# Patient Record
Sex: FEMALE | Race: WHITE | Employment: PART TIME | URBAN - METROPOLITAN AREA
[De-identification: names, ages, dates, MRNs, and addresses within clinical notes are randomized per-mention and may not be internally consistent; named-entity substitution may affect disease eponyms.]

---
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
## 2021-05-07 ENCOUNTER — HOSPITAL ENCOUNTER (OUTPATIENT)
Dept: ULTRASOUND IMAGING | Age: 21
Discharge: HOME | End: 2021-05-07
Payer: COMMERCIAL

## 2021-05-07 DIAGNOSIS — N91.3 PRIMARY OLIGOMENORRHEA: ICD-10-CM

## 2021-05-07 PROCEDURE — 76830 TRANSVAGINAL US NON-OB: CPT

## 2021-05-07 PROCEDURE — 76856 US EXAM PELVIC COMPLETE: CPT

## 2021-12-04 ENCOUNTER — HOSPITAL ENCOUNTER (EMERGENCY)
Age: 21
Discharge: HOME | End: 2021-12-04
Payer: COMMERCIAL

## 2021-12-04 VITALS
OXYGEN SATURATION: 100 % | DIASTOLIC BLOOD PRESSURE: 91 MMHG | RESPIRATION RATE: 20 BRPM | SYSTOLIC BLOOD PRESSURE: 160 MMHG | HEART RATE: 84 BPM | TEMPERATURE: 98.1 F

## 2021-12-04 DIAGNOSIS — J01.90 ACUTE SINUSITIS, RECURRENCE NOT SPECIFIED, UNSPECIFIED LOCATION: Primary | ICD-10-CM

## 2021-12-04 LAB
RAPID INFLUENZA  B AGN: NEGATIVE
RAPID INFLUENZA A AGN: NEGATIVE
S PYO AG THROAT QL: NEGATIVE
SARS-COV-2, NAAT: NOT DETECTED

## 2021-12-04 PROCEDURE — 87081 CULTURE SCREEN ONLY: CPT

## 2021-12-04 PROCEDURE — 6370000000 HC RX 637 (ALT 250 FOR IP): Performed by: PHYSICIAN ASSISTANT

## 2021-12-04 PROCEDURE — 87635 SARS-COV-2 COVID-19 AMP PRB: CPT

## 2021-12-04 PROCEDURE — 87804 INFLUENZA ASSAY W/OPTIC: CPT

## 2021-12-04 PROCEDURE — 87880 STREP A ASSAY W/OPTIC: CPT

## 2021-12-04 PROCEDURE — 99283 EMERGENCY DEPT VISIT LOW MDM: CPT

## 2021-12-04 RX ORDER — PSEUDOEPHEDRINE HYDROCHLORIDE 30 MG/1
60 TABLET ORAL ONCE
Status: COMPLETED | OUTPATIENT
Start: 2021-12-04 | End: 2021-12-04

## 2021-12-04 RX ORDER — PSEUDOEPHEDRINE HYDROCHLORIDE 30 MG/1
30 TABLET ORAL EVERY 6 HOURS PRN
Qty: 30 TABLET | Refills: 1 | Status: SHIPPED | OUTPATIENT
Start: 2021-12-04 | End: 2021-12-19

## 2021-12-04 RX ORDER — FLUTICASONE PROPIONATE 50 MCG
2 SPRAY, SUSPENSION (ML) NASAL DAILY
Qty: 16 G | Refills: 0 | Status: SHIPPED | OUTPATIENT
Start: 2021-12-04

## 2021-12-04 RX ADMIN — PSEUDOEPHEDRINE HCL 60 MG: 30 TABLET, FILM COATED ORAL at 16:35

## 2021-12-04 NOTE — ED PROVIDER NOTES
[facility] Emergency Department    CHIEF COMPLAINT  Cough (2 days) and Nasal Congestion (2 days)      SHARED SERVICE VISIT  Evaluated by RAEGAN. My supervising physician was available for consultation. HISTORY OF PRESENT ILLNESS  Lux Khoury is a 22 y.o. female who presents to the ED complaining of nasal congestion and a cough that is been ongoing for the past 2 days. Patient reports that she has had production of green mucus. No fevers or chills. No Covid or flu vaccinations. Patient states that she has been feeling short of breath as well. No chest pain. Patient states she is taking some over-the-counter cold medicine with mild relief. Patient reports prior history of ear infections in the past.  No other complaints, modifying factors or associated symptoms. Nursing notes reviewed. Past Medical History:   Diagnosis Date    Sleep apnea     does not use CPAP just had study done, they said no sleep apnea (01/2020)    Ulcer of esophagus      Past Surgical History:   Procedure Laterality Date    TONSILLECTOMY      TONSILLECTOMY AND ADENOIDECTOMY      TYMPANOSTOMY TUBE PLACEMENT  [id number]    UPPER GASTROINTESTINAL ENDOSCOPY N/A 1/31/2020    EGD BIOPSY performed by Giles oRbins MD at [address] ENDOSCOPY N/A 6/5/2020    EGD BIOPSY performed by Giles Robins MD at [address] [facility] ENDOSCOPY     Family History   Problem Relation Age of Onset    COPD Maternal Grandmother     Kidney Disease Maternal Grandmother      Social History     Socioeconomic History    Marital status: Single     Spouse name: Not on file    Number of children: Not on file    Years of education: Not on file    Highest education level: Not on file   Occupational History    Not on file   Tobacco Use    Smoking status: Never Smoker    Smokeless tobacco: Never Used   Vaping Use    Vaping Use: Never used   Substance and Sexual Activity    Alcohol use:  No  Drug use: No    Sexual activity: Not Currently   Other Topics Concern    Not on file   Social History Narrative    Not on file     Social Determinants of Health     Financial Resource Strain:     Difficulty of Paying Living Expenses: Not on file   Food Insecurity:     Worried About Running Out of Food in the Last Year: Not on file    Leticia of Food in the Last Year: Not on file   Transportation Needs:     Lack of Transportation (Medical): Not on file    Lack of Transportation (Non-Medical): Not on file   Physical Activity:     Days of Exercise per Week: Not on file    Minutes of Exercise per Session: Not on file   Stress:     Feeling of Stress : Not on file   Social Connections:     Frequency of Communication with Friends and Family: Not on file    Frequency of Social Gatherings with Friends and Family: Not on file    Attends [employer] Services: Not on file    Active Member of [address] [place] [address] [employer] or Organizations: Not on file    Attends Club or Organization Meetings: Not on file    Marital Status: Not on file   Intimate Partner Violence:     Fear of Current or Ex-Partner: Not on file    Emotionally Abused: Not on file    Physically Abused: Not on file    Sexually Abused: Not on file   Housing Stability:     Unable to Pay for Housing in the Last Year: Not on file    Number of [place] in the Last Year: Not on file    Unstable Housing in the Last Year: Not on file     No current facility-administered medications for this encounter.      Current Outpatient Medications   Medication Sig Dispense Refill    pseudoephedrine (DECONGESTANT) 30 MG tablet Take 1 tablet by mouth every 6 hours as needed for Congestion 30 tablet 1    fluticasone (FLONASE) 50 MCG/ACT nasal spray 2 sprays by Each Nostril route daily 16 g 0    Acetaminophen (TYLENOL PO) Take by mouth as needed      omeprazole (PRILOSEC) 20 MG delayed release capsule Take 1 capsule by mouth Daily 30 capsule 3     No Known Allergies    REVIEW OF SYSTEMS  7 systems reviewed, pertinent positives per HPI otherwise noted to be negative    PHYSICAL EXAM  BP (!) 160/91   Pulse 104   Temp 98.1 °F (36.7 °C) (Oral)   Resp 20   SpO2 100%   GENERAL APPEARANCE: Awake and alert. Cooperative. HEAD: Normocephalic. Atraumatic. EYES: EOM grossly intact. ENT: Mucous membranes are moist.  There is scarring of the left tympanic membrane without erythema. Right erythema is pearly gray. Canals are nonedematous  NECK: Supple. HEART: RRR. No murmurs. LUNGS: Respirations unlabored. CTAB. Good air exchange. Speaking comfortably in full sentences. EXTREMITIES: No peripheral edema. Moves all extremities equally. SKIN: Warm and dry. No acute rashes. NEUROLOGICAL: Alert and oriented. No gross facial drooping. PSYCHIATRIC: Normal mood and affect. RADIOLOGY  No orders to display         LABS  Labs Reviewed   COVID-19, RAPID    Narrative:     Performed at:  [place] [address] [facility]   Phone [phone] A THROAT    Narrative:     Performed at:  [address], [facility]   Phone [phone]   RAPID INFLUENZA A/B ANTIGENS    Narrative:     Performed at:  [address], [facility]   Phone [phone]   CULTURE, BETA STREP CONFIRM PLATES       PROCEDURES  Unless otherwise noted below, none  Procedures        CRITICAL CARE TIME          MDM  26-year-old female history of post SA, high BMI 53 presents to ED for evaluation of nasal congestion and a cough has been ongoing for the past 2 days. Patient reports green drainage as well. On exam his appreciation of scarring of the left TM however no signs of otitis media. Oropharynx is clear nonedematous nonerythematous. Uvula midline. No peritonsillar abscess. Lungs are clear to auscultation without any wheeze.     Given the ongoing pandemic we will plan to test patient for COVID-19 as well as influenza and strep. Pain is also*recommend that patient take pseudoephedrine to help relieve the sinus pressure and use intranasal Flonase for the congestion. At this time given only 2 days of symptoms that she reports a believe low likelihood of bacterial sinusitis however if her symptoms would worsen or not improve over this week I recommend that she return or go to primary care for evaluation of bacterial sinusitis. Patient was agreeable this plan and was discharged home. DISPOSITION  Patient was discharged to home in good condition. CLINICAL IMPRESSION  1.  Acute sinusitis, recurrence not specified, unspecified location           Sky Hylton PA-C  12/04/21 0844

## 2021-12-06 LAB — S PYO THROAT QL CULT: NORMAL

## 2022-07-08 ENCOUNTER — TELEPHONE (OUTPATIENT)
Dept: BARIATRICS/WEIGHT MGMT | Age: 22
End: 2022-07-08

## 2022-07-08 NOTE — TELEPHONE ENCOUNTER
Patient was sent Dr. Ludwig Gallegos digital bariatric seminar. Patient DOES have BWLS coverage with [employer] (6m diet) Bariatric benefit form scanned in media.       *Spoke with pt, Info given  No HX of WLS, BMI > 35  pk mailed

## 2022-08-18 ENCOUNTER — OFFICE VISIT (OUTPATIENT)
Dept: BARIATRICS/WEIGHT MGMT | Age: 22
End: 2022-08-18
Payer: COMMERCIAL

## 2022-08-18 VITALS
HEART RATE: 62 BPM | OXYGEN SATURATION: 98 % | DIASTOLIC BLOOD PRESSURE: 74 MMHG | WEIGHT: 293 LBS | HEIGHT: 66 IN | SYSTOLIC BLOOD PRESSURE: 116 MMHG | BODY MASS INDEX: 47.09 KG/M2 | RESPIRATION RATE: 18 BRPM

## 2022-08-18 DIAGNOSIS — G89.29 CHRONIC MIDLINE LOW BACK PAIN WITHOUT SCIATICA: ICD-10-CM

## 2022-08-18 DIAGNOSIS — K22.10 ESOPHAGEAL ULCER WITHOUT BLEEDING: ICD-10-CM

## 2022-08-18 DIAGNOSIS — M54.50 CHRONIC MIDLINE LOW BACK PAIN WITHOUT SCIATICA: ICD-10-CM

## 2022-08-18 DIAGNOSIS — E66.01 MORBID OBESITY WITH BMI OF 60.0-69.9, ADULT (HCC): Primary | ICD-10-CM

## 2022-08-18 DIAGNOSIS — K21.9 CHRONIC GERD: ICD-10-CM

## 2022-08-18 PROCEDURE — G8427 DOCREV CUR MEDS BY ELIG CLIN: HCPCS | Performed by: SURGERY

## 2022-08-18 PROCEDURE — 1036F TOBACCO NON-USER: CPT | Performed by: SURGERY

## 2022-08-18 PROCEDURE — G8417 CALC BMI ABV UP PARAM F/U: HCPCS | Performed by: SURGERY

## 2022-08-18 PROCEDURE — 99215 OFFICE O/P EST HI 40 MIN: CPT | Performed by: SURGERY

## 2022-08-18 NOTE — PROGRESS NOTES
[facility]) Physicians   [facility] [employer]  Melissa Rodriguez MD, [address] [facility], [address] [place] [id number] . Phone: [phone]  Fax: [phone]       Chief Complaint   Patient presents with    Bariatric, Initial Visit     NP restart WLS [facility]           HPI:    Lux Khoury is a very pleasant 22 y.o. obese female ,   Body mass index is 64.08 kg/m². And multiple medical problems who is presenting for weight loss surgery evaluation and consultation by Dr. Audelia Lieberman. Patient has been struggling for several years now with obesity. Patient feels the weight is an obstacle to achieve and perform things in daily living as well risk on health. Tries to diet, and exercise but can't keep the weight off. Patient tried none. Patient has not participated in meal replacement/liquid diets. Patient has not participated in weight loss medications and other regimens, but with no sustainable weight loss. Patient  is very determined to lose weight and be healthy, and is interested in surgical weight loss for future weight loss. .    Otherwise patient denies any nausea, vomiting, fevers, chills, shortness of breath, chest pain, constipation or urinary symptoms.         Obesity related problems Nguyễn Vick is dealing with:  Patient Active Problem List   Diagnosis    Preoperative clearance    Morbid obesity with BMI of 45.0-49.9, adult (HCC)    NSAID long-term use    Chronic GERD    Chronic midline low back pain without sciatica    Esophageal ulcer without bleeding    Abnormal tympanic membrane of left ear    Morbid obesity with BMI of 60.0-69.9, adult ([address]           Pain Assessment   Denies any abdominal pain     Past Medical History:   Diagnosis Date    Chronic GERD 11/26/2019    Chronic midline low back pain without sciatica 11/26/2019    Morbid obesity with BMI of 60.0-69.9, adult ([address] 8/4/2020    Sleep apnea     does not use CPAP just had study done, they said no sleep apnea (01/2020)    Ulcer of esophagus      Past Surgical History:   Procedure Laterality Date    TONSILLECTOMY      TONSILLECTOMY AND ADENOIDECTOMY      TYMPANOSTOMY TUBE PLACEMENT  [id number]    UPPER GASTROINTESTINAL ENDOSCOPY N/A 1/31/2020    EGD BIOPSY performed by Yesenia Hermosillo MD at [address] [facility] N/A 6/5/2020    EGD BIOPSY performed by Yeesnia Hermosillo MD at [address]     Family History   Problem Relation Age of Onset    COPD Maternal Grandmother     Kidney Disease Maternal Grandmother      Social History     Tobacco Use    Smoking status: Never    Smokeless tobacco: Never   Substance Use Topics    Alcohol use: No         I counseled the patient on the risks of Smoking, ETOH or Drug use, and importance of completely avoiding them, otherwise patient risks surgery cancellation or post operative serious complications if they start using any. Renetta Garcia acknowledged, agreed not to use and wants to proceed. No Known Allergies  Vitals:    08/18/22 1038   BP: 116/74   Pulse: 62   Resp: 18   SpO2: 98%   Weight: (!) 397 lb (180.1 kg)   Height: 5' 6\" (1.676 m)       Body mass index is 64.08 kg/m².       Current Outpatient Medications:     fluticasone (FLONASE) 50 MCG/ACT nasal spray, 2 sprays by Each Nostril route daily (Patient not taking: Reported on 8/18/2022), Disp: 16 g, Rfl: 0    Acetaminophen (TYLENOL PO), Take by mouth as needed (Patient not taking: Reported on 8/18/2022), Disp: , Rfl:     omeprazole (PRILOSEC) 20 MG delayed release capsule, Take 1 capsule by mouth Daily (Patient not taking: Reported on 8/18/2022), Disp: 30 capsule, Rfl: 3      Review of Systems - History obtained from the patient  General ROS: overweight   Psychological ROS: negative  Ophthalmic ROS: negative  Neurological ROS: negative  ENT ROS: negative  Allergy and Immunology ROS: negative  Hematological and Lymphatic ROS: negative  Endocrine ROS: overweight  Breast ROS: negative  Respiratory ROS: negative  Cardiovascular ROS: negative  Gastrointestinal ROS:negative  Genito-Urinary ROS: negative  Musculoskeletal ROS: weight effects on joints  Skin ROS: negative    Physical Exam   Constitutional: Patient is oriented to person, place, and time. Vital signs are normal. Patient  appears well-developed and well-nourished. Patient  is active and cooperative. Non-toxic appearance. No distress. HENT:   Head: Normocephalic and atraumatic. Head is without laceration. Right Ear: External ear normal. No lacerations. No drainage, swelling or tenderness. Left Ear: External ear normal. No lacerations. No drainage, swelling or tenderness. Nose/Mouth/Throat: Patient is wearing mask due to Covid-19 pandemic precautions, following CDC and health authorities guidelines. Eyes: Conjunctivae, EOM and lids are normal. Pupils are equal, round, and reactive to light. Right eye exhibits no discharge. No foreign body present in the right eye. Left eye exhibits no discharge. No foreign body present in the left eye. No scleral icterus. Neck: Trachea normal and normal range of motion. Neck supple. No JVD present. No tracheal tenderness present. Carotid bruit is not present. No rigidity. No tracheal deviation and no edema present. No thyromegaly present. Cardiovascular: Normal rate, regular rhythm, normal heart sounds, intact distal pulses and normal pulses. Pulmonary/Chest: Effort normal and breath sounds normal. No stridor. No respiratory distress. Patient  has no wheezes. Patient has no rales. Patient exhibits no tenderness and no crepitus. Abdominal: Soft. Normal appearance and bowel sounds are normal. Patient exhibits no distension, no abdominal bruit, no ascites and no mass. There is no hepatosplenomegaly. There is no tenderness. There is no rigidity, no rebound, no guarding and no CVA tenderness. No hernia. Hernia confirmed negative in the ventral area.    Musculoskeletal: Normal range of motion. Patient exhibits no edema or tenderness. Lymphadenopathy:        Head (right side): No submental, no submandibular, no preauricular, no posterior auricular and no occipital adenopathy present. Head (left side): No submental, no submandibular, no preauricular, no posterior auricular and no occipital adenopathy present. Patient  has no cervical adenopathy. Right: No supraclavicular adenopathy present. Left: No supraclavicular adenopathy present. Neurological: Patient is alert and oriented to person, place, and time. Patient has normal strength. Coordination and gait normal. GCS eye subscore is 4. GCS verbal subscore is 5. GCS motor subscore is 6. Skin: Skin is warm and dry. No abrasion and no rash noted. Patient  is not diaphoretic. No cyanosis or erythema. Psychiatric: Patient has a normal mood and affect. speech is normal and behavior is normal. Cognition and memory are normal.         Valentín Moore was seen today for bariatric, initial visit. Diagnoses and all orders for this visit:    Morbid obesity with BMI of 60.0-69.9, adult ([facility]  -     CBC with Auto Differential; Future  -     Comprehensive Metabolic Panel; Future  -     Hemoglobin A1C; Future  -     Iron and TIBC; Future  -     Lipid Panel; Future  -     TSH with Reflex; Future  -     Vitamin A; Future  -     Vitamin B1, Whole Blood; Future  -     Vitamin B12 & Folate; Future  -     Vitamin D 25 Hydroxy; Future  -     Vitamin E; Future  -     Protime-INR; Future  -     Ambulatory referral to Cardiology    Esophageal ulcer without bleeding  -     CBC with Auto Differential; Future  -     Comprehensive Metabolic Panel; Future  -     Hemoglobin A1C; Future  -     Iron and TIBC; Future  -     Lipid Panel; Future  -     TSH with Reflex; Future  -     Vitamin A; Future  -     Vitamin B1, Whole Blood; Future  -     Vitamin B12 & Folate; Future  -     Vitamin D 25 Hydroxy;  Future  -     Vitamin E; Future  -     Protime-INR; Future  -     Ambulatory referral to Cardiology    Chronic GERD  -     CBC with Auto Differential; Future  -     Comprehensive Metabolic Panel; Future  -     Hemoglobin A1C; Future  -     Iron and TIBC; Future  -     Lipid Panel; Future  -     TSH with Reflex; Future  -     Vitamin A; Future  -     Vitamin B1, Whole Blood; Future  -     Vitamin B12 & Folate; Future  -     Vitamin D 25 Hydroxy; Future  -     Vitamin E; Future  -     Protime-INR; Future  -     Ambulatory referral to Cardiology    Chronic midline low back pain without sciatica  -     CBC with Auto Differential; Future  -     Comprehensive Metabolic Panel; Future  -     Hemoglobin A1C; Future  -     Iron and TIBC; Future  -     Lipid Panel; Future  -     TSH with Reflex; Future  -     Vitamin A; Future  -     Vitamin B1, Whole Blood; Future  -     Vitamin B12 & Folate; Future  -     Vitamin D 25 Hydroxy; Future  -     Vitamin E; Future  -     Protime-INR; Future  -     Ambulatory referral to Cardiology          A/P  Azeem Rider is a very pleasant 22 y.o. female with Obesity,  Body mass index is 64.08 kg/m². and multiple obesity related co-morbidities. Azeem Rider is very motivated to lose weight and being more healthy. We discussed how her weight affects her overall health including:  Patient Active Problem List   Diagnosis    Preoperative clearance    Morbid obesity with BMI of 45.0-49.9, adult (HCC)    NSAID long-term use    Chronic GERD    Chronic midline low back pain without sciatica    Esophageal ulcer without bleeding    Abnormal tympanic membrane of left ear    Morbid obesity with BMI of 60.0-69.9, adult ([address]          The patient underwent extensive dietary counseling with the registered dietitian. I have reviewed, discussed and agree with the dietary plan. Medical weight loss and different surgical options were discussed in details with patient.  Renetta Fritz is interested in surgical weight loss for future weight loss.    Patient is interested in Laparoscopic Sleeve Gastrectomy, which I believe is an excellent option. I explained to the patient that surgery does carry a risk specially with the coexisting comorbid conditions the patient have. Surgery as well in obese patiens can carry more risk. Risks including but not limited to; Infection, bleeding, gastric leak or obstruction, persistent nausea, vomiting, or reflux, injury to surrounding structures, risks of anesthesia, stricture, delayed gastric emptying, staple line leak, incisional hernia, malnutrition , hair loss, and/ or Conversion to Open surgery may be necessary. Failure to lose or maintain weight loss, Gallstones or Kidney Stones, Deep Venous Thrombosis , pulmonary embolism and / or death. However I do believe the benefits outweighs that risk. Obdulia [place]marilee understands the risks and wants to proceed. We will proceed with pre-operative work up labs and studies. Will also petition patient's  insurance for approval for this procedure. I advised the patient that we can't guarantee final insurance approval.    Patient received dietary handouts and education. Patient advised that its their responsibility to follow up for studies, referrals and/or labs ordered today. Also discussed in details the importance of follow up, as well following the recommendations and completing the whole program to improve outcomes when it comes to healthier lifestyle as well weight loss. Patient also advised about risks and benefits being on a strict dietary regimen as well using supplements.  Patient agrees and wants to proceed with weight loss planning     Today's encounter included any number of the following: Bariatric Pre/Post operative work up/protocols, review of labs, imaging, provider notes, outside hospital records, performing examination/evaluation, counseling patient and/or family, ordering medications/tests, placing referrals and communication with referring physicians, coordination of care; discussing dietary plan/recall with the patient as well with registered dietitian and documentation in the EHR. Of note, the above was done during same day of the actual patient encounter. Obesity as a disease is considered a high risk to patients overall health and should therefore be considered a high risk disease state. Advised the patient that not getting there weight under control (weight loss hopefully will help with resolving/improving of the comorbid conditions),  that could increase risk of complications/worsening of those conditions on the long-term. Now with Covid-19 pandemic, CDC and health authorities does classify obese patients as vulnerable and high risk as well. Which makes weight loss a priority for improvement of their wellbeing and overall health. CDC has issued the following statement as far Obese patients being at Increased Risk of being critically ill from SARS-Cov-2  \"Severe obesity increases the risk of a serious breathing problem called acute respiratory distress syndrome (ARDS), which is a major complication of QYPHI-91 and can cause difficulties with a doctors ability to provide respiratory support for seriously ill patients. People living with severe obesity can have multiple serious chronic diseases and underlying health conditions that can increase the risk of severe illness from COVID-19. \"       Patient Instructions     -Plan for Laparoscopic sleeve gastrectomy      Pre-operative work up Ordered:    - F/U in 4 weeks. - Nutrition Labs. - Protein Shake Trial.  - Psych Evaluation.   - Cardiac Clearance. - EGD (endoscopy to check your stomach). - Support Group Attendance. - Obtain letter of medical necessity (PCP Letter). - Quit Smoking,  Alcohol, Caffeine and Carbonated Drinks  - Obtain records for Weight History 2 yrs. - Start Regular Exercise and track your activities.    - Start Tracking your food Intake and follow dietary guidelines. - Avoid Pregnancy for 2 yrs from date of surgery. (for female patients in childbearing age)  - Referral to [address] [place] [address].           Patient advised that its their responsibility to follow up for studies, referrals and/or labs ordered today. Please note that some or all of this report was generated using voice recognition software. Please notify me in case of any questions about the content of this document, as some errors in transcription may have occurred .         Electronically signed by Sixto Shin MD , FACS, CHRISTOPHE @ on 8/18/2022 at 4:15 PM

## 2022-08-18 NOTE — PATIENT INSTRUCTIONS
-Plan for Laparoscopic sleeve gastrectomy      Pre-operative work up Ordered:    - F/U in 4 weeks. - Nutrition Labs. - Protein Shake Trial.  - Psych Evaluation.   - Cardiac Clearance. - EGD (endoscopy to check your stomach). - Support Group Attendance. - Obtain letter of medical necessity (PCP Letter). - Quit Smoking,  Alcohol, Caffeine and Carbonated Drinks  - Obtain records for Weight History 2 yrs. - Start Regular Exercise and track your activities. - Start Tracking your food Intake and follow dietary guidelines. - Avoid Pregnancy for 2 yrs from date of surgery. (for female patients in childbearing age)  - Referral to [address] [place] [address].           Patient advised that its their responsibility to follow up for studies, referrals and/or labs ordered today.

## 2022-08-18 NOTE — PROGRESS NOTES
Ankit Beaulieu is a 22 y.o. female with a date of birth of 2000. Vitals:    08/18/22 1038   BP: 116/74   Pulse: 62   Resp: 18   SpO2: 98%    BMI: Body mass index is 64.08 kg/m². Obesity Classification: Class III    Weight History: Wt Readings from Last 3 Encounters:   08/18/22 (!) 397 lb (180.1 kg)   11/16/20 (!) 333 lb 12.8 oz (151.4 kg) (>99 %, Z= 2.94)*   09/25/20 (!) 334 lb (151.5 kg) (>99 %, Z= 2.92)*     * Growth percentiles are based on CDC (Girls, 2-20 Years) data. Patient's lowest adult weight was 280 lbs at age 24. Patient's highest adult weight was 394 lbs at age 22. Patient has participated in the following weight loss programs: none. Patient has not participated in meal replacement/liquid diets. Patient has not participated in weight loss medications. Patient is not lactose intolerant. Patient does not have Episcopalian/cultural food concerns. Patient does not have food allergies. Patient does tolerate artificial sweeteners. 24 hour recall/food frequency chart:    Breakfast: 1 pancake and 1 sausage  Snack: none  Lunch: 2 slices of lunchmeat sandwich on 2 pieces of wheat bread with 1 slice of cheese and I TBSP and auguste with 1 small bag of chips  Snack: none  Dinner: 2 chicken tenders and 1 sweet potato  Snack: none   Drinks throughout the day: water-3 bottles/day , 2 pops/day  Do you drink alcohol? No. How often/how much alcohol do you drink: none    Patient does not meet the criteria for binge eating disorder. Patient does not have grazing. Patient does not have night eating. Patient does have a history of emotional eating or eating out of boredom. Surgery  Patient does feel confident in her ability to make these changes. The patient's expectations of post-surgical eating habits seems realistic. Patient states she does understand the consequences of not complying with post-op food guidelines.   Patient states she does understands the long term changes in food intake that will be necessary for all occasions after surgery for the rest of her life. Patient is deemed nutritionally appropriate to proceed. Goals  Weight: 250 lbs. Health Improvement: not feeling as tired, more energy, being more active    Assessment  Nutritional Needs:RMR=(9.99 x 180.1 kg) + (6.25 x 167.6 cm) - (4.92 x 21 y.o.) -161  = 2587 kcal x 1.4 (light activity factor)= 3692 kcal - 1000 (for 2 lb weight loss/week)= 2621 kcal.    Plan  Plan/Recommendations: Start presurgical guidelines. Goals:   -Eat 4-5 times daily  -Avoid high fat and high sugar foods  -Include protein with all meals and snacks  -Avoid carbonation and caffeine  -Avoid calorie containing beverages  -Increase physical activity as tolerated    PES Statement:  Overweight/Obesity related to lack of exercise, sedentary lifestyle, unhealthy eating habits, and unsuccessful diet attempts as evidenced by BMI. Body mass index is 64.08 kg/m². Will follow up as necessary.     Karen Dukes RD, LD

## 2022-09-17 PROBLEM — Z01.818 PREOPERATIVE CLEARANCE: Status: RESOLVED | Noted: 2019-11-26 | Resolved: 2022-09-17

## 2022-09-22 ENCOUNTER — OFFICE VISIT (OUTPATIENT)
Dept: BARIATRICS/WEIGHT MGMT | Age: 22
End: 2022-09-22
Payer: COMMERCIAL

## 2022-09-22 VITALS
HEART RATE: 90 BPM | WEIGHT: 293 LBS | BODY MASS INDEX: 47.09 KG/M2 | SYSTOLIC BLOOD PRESSURE: 115 MMHG | HEIGHT: 66 IN | RESPIRATION RATE: 18 BRPM | OXYGEN SATURATION: 99 % | DIASTOLIC BLOOD PRESSURE: 62 MMHG

## 2022-09-22 DIAGNOSIS — K21.9 CHRONIC GERD: ICD-10-CM

## 2022-09-22 DIAGNOSIS — E66.01 MORBID OBESITY WITH BMI OF 60.0-69.9, ADULT (HCC): Primary | ICD-10-CM

## 2022-09-22 DIAGNOSIS — G89.29 CHRONIC MIDLINE LOW BACK PAIN WITHOUT SCIATICA: ICD-10-CM

## 2022-09-22 DIAGNOSIS — M54.50 CHRONIC MIDLINE LOW BACK PAIN WITHOUT SCIATICA: ICD-10-CM

## 2022-09-22 PROCEDURE — 1036F TOBACCO NON-USER: CPT | Performed by: SURGERY

## 2022-09-22 PROCEDURE — G8417 CALC BMI ABV UP PARAM F/U: HCPCS | Performed by: SURGERY

## 2022-09-22 PROCEDURE — G8427 DOCREV CUR MEDS BY ELIG CLIN: HCPCS | Performed by: SURGERY

## 2022-09-22 PROCEDURE — 99214 OFFICE O/P EST MOD 30 MIN: CPT | Performed by: SURGERY

## 2022-09-22 NOTE — PROGRESS NOTES
[facility] ([facility]) Physicians   [employer] [facility] [employer]  Lashon Rosen MD, [facility] [address] [facility] [address], [address] [place] [id number] . Phone: [phone]  Fax: [phone]          Chief Complaint   Patient presents with    Obesity     2nd pre-surg         HPI:     Romero Bauman is a very pleasant 25 y.o. female with Body mass index is 63.92 kg/m². / Chronic Obesity. Nicky Nunn has been struggling for several years now with obesity. Nicky Nunn feels the weight is an obstacle to achieve and perform things in daily living as well risk on health. Patient  is very determined to lose weight and be healthy, and is working towards  surgical weight loss to achieve this goal. Pre-operative clearance and work up pending. Working hard to keep good dietary habits as well level of activity. Patient denies any nausea, vomiting, fevers, chills, shortness of breath, chest pain, cough, constipation or difficulty urinating.     Pain Assessment   Denies any abdominal pain       Past Medical History:   Diagnosis Date    Chronic GERD 11/26/2019    Chronic midline low back pain without sciatica 11/26/2019    Morbid obesity with BMI of 60.0-69.9, adult ([facility] [address] 8/4/2020    Sleep apnea     does not use CPAP just had study done, they said no sleep apnea (01/2020)    Ulcer of esophagus      Past Surgical History:   Procedure Laterality Date    TONSILLECTOMY      TONSILLECTOMY AND ADENOIDECTOMY      TYMPANOSTOMY TUBE PLACEMENT  [id number]    UPPER GASTROINTESTINAL ENDOSCOPY N/A 1/31/2020    EGD BIOPSY performed by Kamaljit Colin MD at [facility] N/A 6/5/2020    EGD BIOPSY performed by Kamaljit Colin MD at [address] [place] [address]     Family History   Problem Relation Age of Onset    COPD Maternal Grandmother     Kidney Disease Maternal Grandmother      Social History     Tobacco Use    Smoking status: Never    Smokeless tobacco: Never   Substance Use Topics    Alcohol use: No     I counseled the patient on the importance of not smoking and risks of ETOH. No Known Allergies  Vitals:    09/22/22 0930   BP: 115/62   Pulse: 90   Resp: 18   SpO2: 99%   Weight: (!) 396 lb (179.6 kg)   Height: 5' 6\" (1.676 m)       Body mass index is 63.92 kg/m².     Lab Results   Component Value Date/Time    WBC 7.3 02/27/2020 12:47 PM    RBC 4.97 02/27/2020 12:47 PM    HGB 13.3 02/27/2020 12:47 PM    HCT 41.1 02/27/2020 12:47 PM    MCV 82.7 02/27/2020 12:47 PM    MCH 26.8 02/27/2020 12:47 PM    MCHC 32.4 02/27/2020 12:47 PM    MPV 8.6 02/27/2020 12:47 PM    NEUTOPHILPCT 45.4 02/27/2020 12:47 PM    LYMPHOPCT 44.3 02/27/2020 12:47 PM    MONOPCT 7.9 02/27/2020 12:47 PM    EOSRELPCT 1.4 02/27/2020 12:47 PM    BASOPCT 1.0 02/27/2020 12:47 PM    NEUTROABS 3.3 02/27/2020 12:47 PM    LYMPHSABS 3.3 02/27/2020 12:47 PM    MONOSABS 0.6 02/27/2020 12:47 PM    EOSABS 0.1 02/27/2020 12:47 PM     Lab Results   Component Value Date/Time     02/27/2020 12:47 PM    K 4.1 02/27/2020 12:47 PM     02/27/2020 12:47 PM    CO2 24 02/27/2020 12:47 PM    ANIONGAP 9 02/27/2020 12:47 PM    GLUCOSE 79 02/27/2020 12:47 PM    BUN 11 02/27/2020 12:47 PM    CREATININE 0.8 02/27/2020 12:47 PM    LABGLOM >60 02/27/2020 12:47 PM    GFRAA >60 02/27/2020 12:47 PM    CALCIUM 8.9 02/27/2020 12:47 PM    PROT 7.1 02/27/2020 12:47 PM    LABALBU 3.9 02/27/2020 12:47 PM    AGRATIO 1.2 02/27/2020 12:47 PM    BILITOT 0.3 02/27/2020 12:47 PM    ALKPHOS 65 02/27/2020 12:47 PM    ALT 16 02/27/2020 12:47 PM    AST 20 02/27/2020 12:47 PM    GLOB 3.2 02/27/2020 12:47 PM     Lab Results   Component Value Date/Time    CHOL 96 01/15/2020 06:35 AM    TRIG 118 01/15/2020 06:35 AM    HDL 32 01/15/2020 06:35 AM    LDLCALC 40 01/15/2020 06:35 AM    LABVLDL 24 01/15/2020 06:35 AM     No results found for: TSHREFLEX  Lab Results   Component Value Date/Time    IRON 52 01/15/2020 06:35 AM    TIBC 396 01/15/2020 06:35 AM     Lab Results   Component Value Date/Time EBJUAKOJ89 440 01/15/2020 06:35 AM    FOLATE 11.84 01/15/2020 06:35 AM     Lab Results   Component Value Date/Time    VITD25 26.8 01/15/2020 06:35 AM     Lab Results   Component Value Date/Time    LABA1C 5.5 01/15/2020 06:35 AM    .2 01/15/2020 06:35 AM         Current Outpatient Medications:     fluticasone (FLONASE) 50 MCG/ACT nasal spray, 2 sprays by Each Nostril route daily (Patient not taking: Reported on 8/18/2022), Disp: 16 g, Rfl: 0    Acetaminophen (TYLENOL PO), Take by mouth as needed (Patient not taking: Reported on 8/18/2022), Disp: , Rfl:     omeprazole (PRILOSEC) 20 MG delayed release capsule, Take 1 capsule by mouth Daily (Patient not taking: Reported on 8/18/2022), Disp: 30 capsule, Rfl: 3    Review of Systems - History obtained from the patient  General ROS: negative  Psychological ROS: negative  Ophthalmic ROS: negative  Neurological ROS: negative  ENT ROS: negative  Allergy and Immunology ROS: negative  Hematological and Lymphatic ROS: negative  Endocrine ROS: negative  Breast ROS: negative  Respiratory ROS: negative  Cardiovascular ROS: negative  Gastrointestinal ROS:negative  Genito-Urinary ROS: negative  Musculoskeletal ROS: negative   Skin ROS: negative    Physical Exam   Vitals Reviewed   Constitutional: Patient is oriented to person, place, and time. Patient appears well-developed and well-nourished. Patient is active and cooperative. Non-toxic appearance. No distress. HENT:   Head: Normocephalic and atraumatic. Head is without abrasion and without laceration. Hair is normal.   Right Ear: External ear normal. No lacerations. No drainage, swelling . Left Ear: External ear normal. No lacerations. No drainage, swelling. Nose/Mouth: face mask in place  Eyes: Conjunctivae, EOM and lids are normal. Right eye exhibits no discharge. No foreign body present in the right eye. Left eye exhibits no discharge. No foreign body present in the left eye. No scleral icterus.    Neck: Trachea normal and normal range of motion. No JVD present. Pulmonary/Chest: Effort normal. No accessory muscle usage or stridor. No apnea. No respiratory distress. Cardiovascular: Normal rate and no JVD. Abdominal: Normal appearance. Patient exhibits no distension. Abdomen is soft, obese, non tender. Musculoskeletal: Normal range of motion. Patient exhibits no edema. Neurological: Patient is alert and oriented to person, place, and time. Patient has normal strength. GCS eye subscore is 4. GCS verbal subscore is 5. GCS motor subscore is 6. Skin: Skin is warm and dry. No abrasion and no rash noted. Patient is not diaphoretic. No cyanosis or erythema. Psychiatric: Patient has a normal mood and affect. Speech is normal and behavior is normal. Cognition and memory are normal.       A/P    Ellen Rosas is 25 y.o. female, Body mass index is 63.92 kg/m². pre surgery, has lost 1 lb since last visit. The patient underwent extensive dietary counseling with registered dietician. I have reviewed, discussed and agree with the dietary plan. Patient is trying hard to keep good dietary and behavior modifications. Patient is monitoring portion sizes, food choices and liquid calories. Patient is trying to exercise regularly as much as possible. Obesity as a disease is considered a high risk to patients overall health and should therefore be considered a high risk disease state. Advised the patient that not getting there weight under control, that could increase risk of complications/worsening of those conditions on the long-term. (Goal of weight loss surgery is to alleviate/control some of those co-morbidities)    Now with Covid-19 pandemic, CDC and health authorities does classify obese patients as vulnerable and high risk as well. Which makes weight loss a priority for improvement of their wellbeing and overall health. I encouraged the patient to continue exercise and keeping healthy eating habits.   Discussed pre-op labs and work up till now. Also counseled the patient extensively on Surgery. I did explain thoroughly to the patient that compliance with pre- and post op diet and other recommendations are integral part to improve the chances of successful weight loss and also not following it could end with serious health complications. Some strategies discussed today include but not limited to : 30/30/30 minutes rule, food diary, avoid fast food and packing/planing ahead, & increasing exercise. Also stressed to the patient importance of taking the multivitamins as instructed, otherwise risk significant complications. The visit today, included any number of the following: Bariatric Preoperative work up/protocols, review of labs, imaging, provider notes, outside hospital records, performing examination/evaluation, counseling patient and/or family, ordering medications/tests, placing referrals and communication with referring physicians, coordination of care; discussing dietary plan/recall with the patient as well with registered dietitian and documentation in the EHR. Of note, the above was done during same day of the actual patient encounter. Cassidy Mcconnell is here for her second presurgical visit. Patient still working on scheduling her pre-Operative clearances. We discussed how her excess weight affects her overall health and importance of weight loss, healthy diet and active lifestyle to alleviate those co morbid conditions, otherwise risk deterioration. Morbid Obesity: Body mass index is 63.92 kg/m². [x] Continue to make dietary and lifestyle modifications. [x] Plan for Future laparoscopic sleeve gastrectomy. [x] Return for follow-up next month. Chronic GERD:   [x] Continue to make dietary and lifestyle modifications. [] Continue Omeprazole. [] Continue Famotidine. [x] Plan for EGD to evaluate the stomach.     Chronic midline low back pain without sciatica:  [x] Continue to make dietary and lifestyle modifications. [x] Continue with weight loss. Patient advised that its their responsibility to follow up for studies, referrals and/or labs ordered today.

## 2022-09-22 NOTE — PATIENT INSTRUCTIONS
Patient received dietary handouts and education.     Plan/Recommendations:   - Focus on lean protein 4x/day  - Complete Support Group  - Walk the dogs at least 2x/wk for 10 min

## 2022-09-22 NOTE — PROGRESS NOTES
Lux Iraida lost 1 lbs over past ~month. Is pt eating at least 4 times everyday? yes    B- 1/2 bagel & cream cheese  L- sandwich: turkey, cheese & a little auguste w/ veggie straw chips  S- 1 cup of CC OR pretzels  D- chicken (experimenting with recipes) w/ veggies (green beans / corn / broccoli)    Is pt eating a lean protein source with all meals and snacks? most of the time    Has pt decreased their portions using the plate method?  yes    Is pt choosing low fat/sugar free options? yes overall, occasional snack cake    Is pt drinking at least 64 oz of clear liquids everyday? yes - water / propel    Has pt stopped drinking carbonation, caffeinated, and sugar sweetened beverages? yes    Has pt sampled Unjury and/or Nectar protein?  yes - tried & tolerated    Has pt attended a support group? to schedule    Participating in intentional exercise? no    Plan/Recommendations:   - Focus on lean protein 4x/day  - Complete Support Group  - Walk the dogs at least 2x/wk for 10 min    Handouts: none    Anh Mcconnell RD, LD

## 2022-09-24 ENCOUNTER — HOSPITAL ENCOUNTER (OUTPATIENT)
Age: 22
Discharge: HOME | End: 2022-09-24
Payer: COMMERCIAL

## 2022-09-24 DIAGNOSIS — K22.10 ESOPHAGEAL ULCER WITHOUT BLEEDING: ICD-10-CM

## 2022-09-24 DIAGNOSIS — M54.50 CHRONIC MIDLINE LOW BACK PAIN WITHOUT SCIATICA: ICD-10-CM

## 2022-09-24 DIAGNOSIS — K21.9 CHRONIC GERD: ICD-10-CM

## 2022-09-24 DIAGNOSIS — G89.29 CHRONIC MIDLINE LOW BACK PAIN WITHOUT SCIATICA: ICD-10-CM

## 2022-09-24 DIAGNOSIS — E66.01 MORBID OBESITY WITH BMI OF 60.0-69.9, ADULT (HCC): ICD-10-CM

## 2022-09-24 LAB
A/G RATIO: 1.6 (ref 1.1–2.2)
ALBUMIN SERPL-MCNC: 4 G/DL (ref 3.4–5)
ALP BLD-CCNC: 78 U/L (ref 40–129)
ALT SERPL-CCNC: 15 U/L (ref 10–40)
ANION GAP SERPL CALCULATED.3IONS-SCNC: 12 MMOL/L (ref 3–16)
AST SERPL-CCNC: 11 U/L (ref 15–37)
BASOPHILS ABSOLUTE: 0.1 K/UL (ref 0–0.2)
BASOPHILS RELATIVE PERCENT: 0.5 %
BILIRUB SERPL-MCNC: 0.3 MG/DL (ref 0–1)
BUN BLDV-MCNC: 11 MG/DL (ref 7–20)
CALCIUM SERPL-MCNC: 9 MG/DL (ref 8.3–10.6)
CHLORIDE BLD-SCNC: 105 MMOL/L (ref 99–110)
CHOLESTEROL, TOTAL: 127 MG/DL (ref 0–199)
CO2: 22 MMOL/L (ref 21–32)
CREAT SERPL-MCNC: 0.6 MG/DL (ref 0.6–1.1)
EOSINOPHILS ABSOLUTE: 0.1 K/UL (ref 0–0.6)
EOSINOPHILS RELATIVE PERCENT: 1 %
FOLATE: 8.13 NG/ML (ref 4.78–24.2)
GFR AFRICAN AMERICAN: >60
GFR NON-AFRICAN AMERICAN: >60
GLUCOSE BLD-MCNC: 96 MG/DL (ref 70–99)
HCT VFR BLD CALC: 37.4 % (ref 36–48)
HDLC SERPL-MCNC: 41 MG/DL (ref 40–60)
HEMOGLOBIN: 11.6 G/DL (ref 12–16)
INR BLD: 1 (ref 0.87–1.14)
IRON SATURATION: 9 % (ref 15–50)
IRON: 36 UG/DL (ref 37–145)
LDL CHOLESTEROL CALCULATED: 66 MG/DL
LYMPHOCYTES ABSOLUTE: 5 K/UL (ref 1–5.1)
LYMPHOCYTES RELATIVE PERCENT: 39.9 %
MCH RBC QN AUTO: 22.3 PG (ref 26–34)
MCHC RBC AUTO-ENTMCNC: 31.1 G/DL (ref 31–36)
MCV RBC AUTO: 71.6 FL (ref 80–100)
MONOCYTES ABSOLUTE: 0.6 K/UL (ref 0–1.3)
MONOCYTES RELATIVE PERCENT: 4.9 %
NEUTROPHILS ABSOLUTE: 6.7 K/UL (ref 1.7–7.7)
NEUTROPHILS RELATIVE PERCENT: 53.7 %
PDW BLD-RTO: 16.5 % (ref 12.4–15.4)
PLATELET # BLD: 299 K/UL (ref 135–450)
PMV BLD AUTO: 8.5 FL (ref 5–10.5)
POTASSIUM SERPL-SCNC: 4.3 MMOL/L (ref 3.5–5.1)
PROTHROMBIN TIME: 13 SEC (ref 11.7–14.5)
RBC # BLD: 5.22 M/UL (ref 4–5.2)
SODIUM BLD-SCNC: 139 MMOL/L (ref 136–145)
TOTAL IRON BINDING CAPACITY: 388 UG/DL (ref 260–445)
TOTAL PROTEIN: 6.5 G/DL (ref 6.4–8.2)
TRIGL SERPL-MCNC: 102 MG/DL (ref 0–150)
TSH REFLEX: 1.34 UIU/ML (ref 0.27–4.2)
VITAMIN B-12: 425 PG/ML (ref 211–911)
VITAMIN D 25-HYDROXY: 33.6 NG/ML
VLDLC SERPL CALC-MCNC: 20 MG/DL
WBC # BLD: 12.6 K/UL (ref 4–11)

## 2022-09-24 PROCEDURE — 80061 LIPID PANEL: CPT

## 2022-09-24 PROCEDURE — 36415 COLL VENOUS BLD VENIPUNCTURE: CPT

## 2022-09-24 PROCEDURE — 84443 ASSAY THYROID STIM HORMONE: CPT

## 2022-09-24 PROCEDURE — 82746 ASSAY OF FOLIC ACID SERUM: CPT

## 2022-09-24 PROCEDURE — 85025 COMPLETE CBC W/AUTO DIFF WBC: CPT

## 2022-09-24 PROCEDURE — 84590 ASSAY OF VITAMIN A: CPT

## 2022-09-24 PROCEDURE — 83540 ASSAY OF IRON: CPT

## 2022-09-24 PROCEDURE — 84425 ASSAY OF VITAMIN B-1: CPT

## 2022-09-24 PROCEDURE — 83036 HEMOGLOBIN GLYCOSYLATED A1C: CPT

## 2022-09-24 PROCEDURE — 85610 PROTHROMBIN TIME: CPT

## 2022-09-24 PROCEDURE — 80053 COMPREHEN METABOLIC PANEL: CPT

## 2022-09-24 PROCEDURE — 83550 IRON BINDING TEST: CPT

## 2022-09-24 PROCEDURE — 82306 VITAMIN D 25 HYDROXY: CPT

## 2022-09-24 PROCEDURE — 82607 VITAMIN B-12: CPT

## 2022-09-24 PROCEDURE — 84446 ASSAY OF VITAMIN E: CPT

## 2022-09-25 LAB
ESTIMATED AVERAGE GLUCOSE: 134.1 MG/DL
HBA1C MFR BLD: 6.3 %

## 2022-09-27 LAB
ALPHA-TOCOPHEROL: 6.3 MG/L (ref 5.5–18)
GAMMA-TOCOPHEROL: 1.8 MG/L (ref 0–6)
RETINYL PALMITATE: <0.02 MG/L (ref 0–0.1)
VITAMIN A LEVEL: 0.44 MG/L (ref 0.3–1.2)
VITAMIN A, INTERP: NORMAL

## 2022-10-01 LAB — VITAMIN B1 WHOLE BLOOD: 121 NMOL/L (ref 70–180)

## 2022-10-20 ENCOUNTER — TELEPHONE (OUTPATIENT)
Dept: BARIATRICS/WEIGHT MGMT | Age: 22
End: 2022-10-20

## 2022-10-20 NOTE — TELEPHONE ENCOUNTER
Spoke with pt about egd on 10/28/2022 arrival of 7:15 am. Pt stated already talked to PAT about instructions- pt verbalized understanding of them all.

## 2022-10-20 NOTE — PROGRESS NOTES
Patient reached __x__ yes  _____ no   VM instructions left ____ yes   phone number ________                                ____ no-office notified          Date ___[id number]______  Time 0915_______  Arrival __0715____    Nothing to eat or drink after midnight. Responsible adult 25 or older to stay on site while you are here-drive you home-stay with you after  Follow any instructions your doctors office has given you  Bring a complete list of all your medications and supplements including name,dose,how often taken the day of your procedure  If you normally take the following medications in the morning please do so the AM of your procedure with a small sip of water       Heart,blood pressure,seizure,thyroid or breathing medications-use your inhalers-bring any rescue inhalers with you DOS       DO NOT take blood pressure medications ending in \"denice\" or \"pril\" the AM of procedure or evening prior  Take half or your normal dose of any long acting insulins the night before your procedure-do not take any diabetic medications the AM of procedure  Follow your doctors instructions regarding stopping or taking  any blood thinners-if you do not have instructions-call them  Any questions call your doctor    VISITOR POLICY(subject to change)             The current policy is 2 visitors per patient. There are no children allowed. Mask at discretion of facility. Visiting hours are 8a-8p. Overnight visitors will be at the discretion of the nurse. All policies are subject to change.

## 2022-10-27 ENCOUNTER — APPOINTMENT (OUTPATIENT)
Dept: GENERAL RADIOLOGY | Age: 22
End: 2022-10-27
Payer: COMMERCIAL

## 2022-10-27 ENCOUNTER — HOSPITAL ENCOUNTER (EMERGENCY)
Age: 22
Discharge: HOME | End: 2022-10-27
Attending: STUDENT IN AN ORGANIZED HEALTH CARE EDUCATION/TRAINING PROGRAM
Payer: COMMERCIAL

## 2022-10-27 VITALS
HEIGHT: 65 IN | DIASTOLIC BLOOD PRESSURE: 70 MMHG | RESPIRATION RATE: 16 BRPM | HEART RATE: 76 BPM | BODY MASS INDEX: 65.9 KG/M2 | OXYGEN SATURATION: 100 % | TEMPERATURE: 97.9 F | SYSTOLIC BLOOD PRESSURE: 115 MMHG

## 2022-10-27 DIAGNOSIS — M54.50 BILATERAL LOW BACK PAIN, UNSPECIFIED CHRONICITY, UNSPECIFIED WHETHER SCIATICA PRESENT: Primary | ICD-10-CM

## 2022-10-27 PROCEDURE — 6360000002 HC RX W HCPCS: Performed by: STUDENT IN AN ORGANIZED HEALTH CARE EDUCATION/TRAINING PROGRAM

## 2022-10-27 PROCEDURE — 96372 THER/PROPH/DIAG INJ SC/IM: CPT

## 2022-10-27 PROCEDURE — 72100 X-RAY EXAM L-S SPINE 2/3 VWS: CPT

## 2022-10-27 PROCEDURE — 6370000000 HC RX 637 (ALT 250 FOR IP): Performed by: STUDENT IN AN ORGANIZED HEALTH CARE EDUCATION/TRAINING PROGRAM

## 2022-10-27 PROCEDURE — 99284 EMERGENCY DEPT VISIT MOD MDM: CPT

## 2022-10-27 RX ORDER — METHOCARBAMOL 500 MG/1
1000 TABLET, FILM COATED ORAL 3 TIMES DAILY
Qty: 42 TABLET | Refills: 0 | Status: SHIPPED | OUTPATIENT
Start: 2022-10-27 | End: 2022-11-03

## 2022-10-27 RX ORDER — ACETAMINOPHEN 500 MG
1000 TABLET ORAL
Status: COMPLETED | OUTPATIENT
Start: 2022-10-27 | End: 2022-10-27

## 2022-10-27 RX ORDER — KETOROLAC TROMETHAMINE 30 MG/ML
30 INJECTION, SOLUTION INTRAMUSCULAR; INTRAVENOUS ONCE
Status: COMPLETED | OUTPATIENT
Start: 2022-10-27 | End: 2022-10-27

## 2022-10-27 RX ADMIN — KETOROLAC TROMETHAMINE 30 MG: 30 INJECTION, SOLUTION INTRAMUSCULAR at 09:41

## 2022-10-27 RX ADMIN — ACETAMINOPHEN 1000 MG: 500 TABLET ORAL at 09:41

## 2022-10-27 ASSESSMENT — PAIN - FUNCTIONAL ASSESSMENT: PAIN_FUNCTIONAL_ASSESSMENT: 0-10

## 2022-10-27 ASSESSMENT — PAIN DESCRIPTION - LOCATION: LOCATION: BACK

## 2022-10-27 ASSESSMENT — PAIN SCALES - GENERAL: PAINLEVEL_OUTOF10: 8

## 2022-10-27 NOTE — ED NOTES
Patient discharge completed. Discharge information included information on diagnosis including signs and symptoms, complications and when to seek medical attention. Information on new medications also provided included use for the medication, side effects and when to call the doctor. Patient verbalized understanding of all discharge information. Patient escorted out by staff with all documented belongings. Home with family.      Juan Zepeda RN  10/27/22 8115

## 2022-10-27 NOTE — DISCHARGE INSTRUCTIONS
Return to the nearest ED if you develop severe worsening pain, new numbness/weakness/tingling, or if you pee or poop on yourself without warning. You can take 1000 mg of acetaminophen every 8 hours and 600 mg of ibuprofen every 6 hours for pain. If these are not controlling her pain, you can take the methocarbamol I have prescribed. If the 1000 mg 3 times a day is not helping, you can go up to 3 pills, which is 1500 mg 3 times a day. Follow-up with your PCP in 2 to 3 days.

## 2022-10-27 NOTE — ED PROVIDER NOTES
[address] ED      CHIEF COMPLAINT  Back Pain (Injured her back at work 2 weeks ago. States she was told it was a slipped disk by a chiropractor. Pt states the pain is worse.)       HISTORY OF PRESENT ILLNESS  Darlis Cowden is a 25 y.o. female  who presents to the ED complaining of low back pain. Patient states that her back started hurting after lifting at work 2 weeks ago. She states that she continue to work like usual and her pain has increased. She has seen a chiropractor but has not had any improvement. Has also been taking Tylenol and ibuprofen without improvement. Denies any abdominal pain, nausea, vomiting. Denies any numbness/weakness/tingling, saddle anesthesia, bowel or bladder incontinence. No other complaints, modifying factors or associated symptoms. I have reviewed the following from the nursing documentation.     Past Medical History:   Diagnosis Date    Chronic GERD 11/26/2019    Chronic midline low back pain without sciatica 11/26/2019    Morbid obesity with BMI of 60.0-69.9, adult ([facility] [address] 8/4/2020    Sleep apnea     does not use CPAP just had study done, they said no sleep apnea (01/2020)    Ulcer of esophagus      Past Surgical History:   Procedure Laterality Date    TONSILLECTOMY      TONSILLECTOMY AND ADENOIDECTOMY      TYMPANOSTOMY TUBE PLACEMENT  [id number]    UPPER GASTROINTESTINAL ENDOSCOPY N/A 1/31/2020    EGD BIOPSY performed by Stella Lefort, MD at [address] [facility] N/A 6/5/2020    EGD BIOPSY performed by Stella Lefort, MD at [facility]     Family History   Problem Relation Age of Onset    COPD Maternal Grandmother     Kidney Disease Maternal Grandmother      Social History     Socioeconomic History    Marital status: Single     Spouse name: Not on file    Number of children: Not on file    Years of education: Not on file    Highest education level: Not on file   Occupational History    Not on file   Tobacco Use Smoking status: Never    Smokeless tobacco: Never   Vaping Use    Vaping Use: Never used   Substance and Sexual Activity    Alcohol use: No    Drug use: No    Sexual activity: Not Currently   Other Topics Concern    Not on file   Social History Narrative    Not on file     Social Determinants of Health     Financial Resource Strain: Not on file   Food Insecurity: Not on file   Transportation Needs: Not on file   Physical Activity: Not on file   Stress: Not on file   Social Connections: Not on file   Intimate Partner Violence: Not on file   Housing Stability: Not on file     Current Facility-Administered Medications   Medication Dose Route Frequency Provider Last Rate Last Admin    acetaminophen (TYLENOL) tablet 1,000 mg  1,000 mg Oral NOW Berton Dapper, DO        ketorolac (TORADOL) injection 30 mg  30 mg IntraMUSCular Once Berton Dapper, DO         Current Outpatient Medications   Medication Sig Dispense Refill    fluticasone (FLONASE) 50 MCG/ACT nasal spray 2 sprays by Each Nostril route daily (Patient not taking: Reported on 8/18/2022) 16 g 0    Acetaminophen (TYLENOL PO) Take by mouth as needed (Patient not taking: Reported on 8/18/2022)      omeprazole (PRILOSEC) 20 MG delayed release capsule Take 1 capsule by mouth Daily (Patient not taking: Reported on 8/18/2022) 30 capsule 3     No Known Allergies    REVIEW OF SYSTEMS  10 systems reviewed, pertinent positives per HPI otherwise noted to be negative. PHYSICAL EXAM  /70   Pulse 76   Temp 97.9 °F (36.6 °C) (Oral)   Resp 16   Ht 5' 5\" (1.651 m)   SpO2 100%   BMI 65.90 kg/m²    General: Appears well. Alert  HEENT: Head atraumatic, Eyes normal inspection, PERRL. Normal ENT inspection, Pharynx normal. No signs of dehydration  NECK: Normal inspection  RESPIRATORY: Normal breath sounds. No chest wall tenderness. No respiratory distress  CVS: Heart rate and rhythm regular.  No Murmurs  ABDOMEN/GI: Soft, Non-tender, No distention  BACK: Bilateral lower lumbar paraspinal tenderness, no midline tenderness  EXTREMITIES: Non-Tender. Decreased range of motion of the lower extremities bilaterally secondary to pain in the back. Normal appearance. No Pedal edema  NEURO: Alert and oriented. Sensation normal. Motor normal  PSYCH: Mood normal. Affect normal.  SKIN: Color normal. No rash. Warm, Dry    RADIOLOGY  XR LUMBAR SPINE (2-3 VIEWS)   Final Result   Moderate intervertebral disc space narrowing suggested at lumbosacral   junction presumed to be L5-S1. Lumbar MRI suggested if clinical concern of   radiculopathy/nerve compression. 10 degrees lumbar side bending to the right which may reflect muscle spasm or   patient positioning. ED COURSE/MDM  Patient seen and evaluated. Old records reviewed. Labs and imaging reviewed and results discussed with patient. Presented with low back pain after lifting. Treated with Tylenol and ketorolac. X-ray obtained and shows lumbosacral narrowing. No radicular symptoms at this time. Discussed risk/benefit of muscle relaxers in this subacute back pain. Patient would like to trial muscle relaxers. She did drive, so she will be given a prescription to trial at home. Given return precautions for severe worsening pain, numbness/weakness/tingling, or bowel or bladder incontinence. Is this patient to be included in the SEP-1 Core Measure due to severe sepsis or septic shock? No   Exclusion criteria - the patient is NOT to be included for SEP-1 Core Measure due to: Infection is not suspected    During the patient's ED course, the patient was given:  Medications   acetaminophen (TYLENOL) tablet 1,000 mg (1,000 mg Oral Given 10/27/22 0941)   ketorolac (TORADOL) injection 30 mg (30 mg IntraMUSCular Given 10/27/22 0941)        CLINICAL IMPRESSION  1.  Bilateral low back pain, unspecified chronicity, unspecified whether sciatica present        Blood pressure 115/70, pulse 76, temperature 97.9 °F (36.6 °C), temperature source Oral, resp. rate 16, height 5' 5\" (1.651 m), SpO2 100 %, not currently breastfeeding. DISPOSITION  Amaris Sheets was discharged to home in stable condition. Patient was given scripts for the following medications. I counseled patient how to take these medications. New Prescriptions    METHOCARBAMOL (ROBAXIN) 500 MG TABLET    Take 2 tablets by mouth 3 times daily for 7 days       Follow-up with:  Víctor Foley MD  [address] [facility]     Schedule an appointment as soon as possible for a visit in 3 days  Follow up within 3 days, Return to ED sooner if symptoms worsen    DISCLAIMER: This chart was created using Dragon dictation software. Efforts were made by me to ensure accuracy, however some errors may be present due to limitations of this technology and occasionally words are not transcribed correctly.      Iván Euceda DO  10/27/22 1031

## 2022-10-27 NOTE — Clinical Note
Ac Mercado was seen and treated in our emergency department on 10/27/2022. She may return to work on 10/28/2022. If you have any questions or concerns, please don't hesitate to call.       Luis Arceo, DO

## 2022-10-28 ENCOUNTER — ANESTHESIA (OUTPATIENT)
Dept: ENDOSCOPY | Age: 22
End: 2022-10-28
Payer: COMMERCIAL

## 2022-10-28 ENCOUNTER — HOSPITAL ENCOUNTER (OUTPATIENT)
Age: 22
Setting detail: OUTPATIENT SURGERY
Discharge: HOME | End: 2022-10-28
Attending: SURGERY | Admitting: SURGERY
Payer: COMMERCIAL

## 2022-10-28 ENCOUNTER — ANESTHESIA EVENT (OUTPATIENT)
Dept: ENDOSCOPY | Age: 22
End: 2022-10-28
Payer: COMMERCIAL

## 2022-10-28 VITALS
TEMPERATURE: 97.7 F | HEIGHT: 65 IN | WEIGHT: 293 LBS | SYSTOLIC BLOOD PRESSURE: 147 MMHG | DIASTOLIC BLOOD PRESSURE: 95 MMHG | RESPIRATION RATE: 20 BRPM | HEART RATE: 64 BPM | BODY MASS INDEX: 48.82 KG/M2 | OXYGEN SATURATION: 100 %

## 2022-10-28 DIAGNOSIS — K21.9 GASTROESOPHAGEAL REFLUX DISEASE, UNSPECIFIED WHETHER ESOPHAGITIS PRESENT: ICD-10-CM

## 2022-10-28 LAB — HCG(URINE) PREGNANCY TEST: NEGATIVE

## 2022-10-28 PROCEDURE — 6360000002 HC RX W HCPCS: Performed by: NURSE ANESTHETIST, CERTIFIED REGISTERED

## 2022-10-28 PROCEDURE — 88305 TISSUE EXAM BY PATHOLOGIST: CPT

## 2022-10-28 PROCEDURE — 7100000010 HC PHASE II RECOVERY - FIRST 15 MIN: Performed by: SURGERY

## 2022-10-28 PROCEDURE — 2500000003 HC RX 250 WO HCPCS: Performed by: NURSE ANESTHETIST, CERTIFIED REGISTERED

## 2022-10-28 PROCEDURE — 3700000000 HC ANESTHESIA ATTENDED CARE: Performed by: SURGERY

## 2022-10-28 PROCEDURE — 3609012400 HC EGD TRANSORAL BIOPSY SINGLE/MULTIPLE: Performed by: SURGERY

## 2022-10-28 PROCEDURE — 7100000011 HC PHASE II RECOVERY - ADDTL 15 MIN: Performed by: SURGERY

## 2022-10-28 PROCEDURE — 43239 EGD BIOPSY SINGLE/MULTIPLE: CPT | Performed by: SURGERY

## 2022-10-28 PROCEDURE — 2709999900 HC NON-CHARGEABLE SUPPLY: Performed by: SURGERY

## 2022-10-28 PROCEDURE — 2580000003 HC RX 258: Performed by: SURGERY

## 2022-10-28 PROCEDURE — 84703 CHORIONIC GONADOTROPIN ASSAY: CPT

## 2022-10-28 RX ORDER — MIDAZOLAM HYDROCHLORIDE 1 MG/ML
INJECTION INTRAMUSCULAR; INTRAVENOUS PRN
Status: DISCONTINUED | OUTPATIENT
Start: 2022-10-28 | End: 2022-10-28 | Stop reason: SDUPTHER

## 2022-10-28 RX ORDER — OMEPRAZOLE 20 MG/1
20 CAPSULE, DELAYED RELEASE ORAL DAILY
Qty: 30 CAPSULE | Refills: 3 | Status: SHIPPED | OUTPATIENT
Start: 2022-10-28 | End: 2023-02-25

## 2022-10-28 RX ORDER — LIDOCAINE HYDROCHLORIDE 20 MG/ML
INJECTION, SOLUTION EPIDURAL; INFILTRATION; INTRACAUDAL; PERINEURAL PRN
Status: DISCONTINUED | OUTPATIENT
Start: 2022-10-28 | End: 2022-10-28 | Stop reason: SDUPTHER

## 2022-10-28 RX ORDER — PROPOFOL 10 MG/ML
INJECTION, EMULSION INTRAVENOUS PRN
Status: DISCONTINUED | OUTPATIENT
Start: 2022-10-28 | End: 2022-10-28 | Stop reason: SDUPTHER

## 2022-10-28 RX ORDER — SODIUM CHLORIDE 9 MG/ML
INJECTION, SOLUTION INTRAVENOUS CONTINUOUS
Status: DISCONTINUED | OUTPATIENT
Start: 2022-10-28 | End: 2022-10-28 | Stop reason: HOSPADM

## 2022-10-28 RX ORDER — GLYCOPYRROLATE 0.2 MG/ML
INJECTION INTRAMUSCULAR; INTRAVENOUS PRN
Status: DISCONTINUED | OUTPATIENT
Start: 2022-10-28 | End: 2022-10-28 | Stop reason: SDUPTHER

## 2022-10-28 RX ORDER — KETAMINE HCL IN NACL, ISO-OSM 100MG/10ML
SYRINGE (ML) INJECTION PRN
Status: DISCONTINUED | OUTPATIENT
Start: 2022-10-28 | End: 2022-10-28 | Stop reason: SDUPTHER

## 2022-10-28 RX ORDER — SUCRALFATE ORAL 1 G/10ML
1 SUSPENSION ORAL 4 TIMES DAILY
Qty: 600 ML | Refills: 0 | Status: SHIPPED | OUTPATIENT
Start: 2022-10-28 | End: 2022-11-12

## 2022-10-28 RX ADMIN — PROPOFOL 80 MG: 10 INJECTION, EMULSION INTRAVENOUS at 09:29

## 2022-10-28 RX ADMIN — Medication 500 MG: at 09:29

## 2022-10-28 RX ADMIN — SODIUM CHLORIDE: 9 INJECTION, SOLUTION INTRAVENOUS at 07:49

## 2022-10-28 RX ADMIN — GLYCOPYRROLATE 0.2 MG: 0.2 INJECTION, SOLUTION INTRAMUSCULAR; INTRAVENOUS at 09:26

## 2022-10-28 RX ADMIN — LIDOCAINE HYDROCHLORIDE 100 MG: 20 INJECTION, SOLUTION EPIDURAL; INFILTRATION; INTRACAUDAL; PERINEURAL at 09:29

## 2022-10-28 RX ADMIN — MIDAZOLAM 2 MG: 1 INJECTION INTRAMUSCULAR; INTRAVENOUS at 09:28

## 2022-10-28 ASSESSMENT — PAIN SCALES - GENERAL: PAINLEVEL_OUTOF10: 0

## 2022-10-28 NOTE — PROGRESS NOTES
Reviewed patient's medical and surgical history in electronic record and with patient at the bedside. All questions regarding procedure answered. Scope number and equipment verified using a two person system. Family in waiting room.     Electronically signed by Nithya Osei RN on 10/28/2022 at 9:27 AM]

## 2022-10-28 NOTE — PROGRESS NOTES
Discharge instructions reviewed with patient and  Zack Ireland. All home medications have been reviewed, pt v/u. Discharge instructions signed. Pt discharged via wheelchair. Pt discharged with all belongings. [facility] taking stable pt home.

## 2022-10-28 NOTE — ANESTHESIA PRE PROCEDURE
(01/2020)    Ulcer of esophagus        Past Surgical History:        Procedure Laterality Date    TONSILLECTOMY      TONSILLECTOMY AND ADENOIDECTOMY      TYMPANOSTOMY TUBE PLACEMENT  [id number]    UPPER GASTROINTESTINAL ENDOSCOPY N/A 1/31/2020    EGD BIOPSY performed by Kaylynn Moeller MD at [address] N/A 6/5/2020    EGD BIOPSY performed by Kaylynn Moeller MD at [address] History:    Social History     Tobacco Use    Smoking status: Never    Smokeless tobacco: Never   Substance Use Topics    Alcohol use: No                                Counseling given: Not Answered      Vital Signs (Current):   Vitals:    10/20/22 1429   Weight: (!) 396 lb (179.6 kg)   Height: 5' 6\" (1.676 m)                                              BP Readings from Last 3 Encounters:   10/27/22 115/70   09/22/22 115/62   08/18/22 116/74       NPO Status: Time of last liquid consumption: 2100                        Time of last solid consumption: 1900                        Date of last liquid consumption: 10/27/22                        Date of last solid food consumption: 10/26/22    BMI:   Wt Readings from Last 3 Encounters:   10/20/22 (!) 396 lb (179.6 kg)   09/22/22 (!) 396 lb (179.6 kg)   08/18/22 (!) 397 lb (180.1 kg)     Body mass index is 63.92 kg/m².     CBC:   Lab Results   Component Value Date/Time    WBC 12.6 09/24/2022 09:14 AM    RBC 5.22 09/24/2022 09:14 AM    HGB 11.6 09/24/2022 09:14 AM    HCT 37.4 09/24/2022 09:14 AM    MCV 71.6 09/24/2022 09:14 AM    RDW 16.5 09/24/2022 09:14 AM     09/24/2022 09:14 AM       CMP:   Lab Results   Component Value Date/Time     09/24/2022 09:14 AM    K 4.3 09/24/2022 09:14 AM    K 4.1 02/27/2020 12:47 PM     09/24/2022 09:14 AM    CO2 22 09/24/2022 09:14 AM    BUN 11 09/24/2022 09:14 AM    CREATININE 0.6 09/24/2022 09:14 AM    GFRAA >60 09/24/2022 09:14 AM    AGRATIO 1.6 09/24/2022 09:14 AM    LABGLOM >60 09/24/2022 09:14 AM    GLUCOSE 96 09/24/2022 09:14 AM    PROT 6.5 09/24/2022 09:14 AM    CALCIUM 9.0 09/24/2022 09:14 AM    BILITOT 0.3 09/24/2022 09:14 AM    ALKPHOS 78 09/24/2022 09:14 AM    AST 11 09/24/2022 09:14 AM    ALT 15 09/24/2022 09:14 AM       POC Tests: No results for input(s): POCGLU, POCNA, POCK, POCCL, POCBUN, POCHEMO, POCHCT in the last 72 hours. Coags:   Lab Results   Component Value Date/Time    PROTIME 13.0 09/24/2022 09:14 AM    INR 1.00 09/24/2022 09:14 AM       HCG (If Applicable):   Lab Results   Component Value Date    PREGTESTUR Negative 10/28/2022        ABGs: No results found for: PHART, PO2ART, TIC8MTK, QVD9KZN, BEART, M2OKOFBQ     Type & Screen (If Applicable):  No results found for: LABABO, LABRH    Drug/Infectious Status (If Applicable):  No results found for: HIV, HEPCAB    COVID-19 Screening (If Applicable):   Lab Results   Component Value Date/Time    COVID19 Not Detected 12/04/2021 04:31 PM    COVID19 Not Detected 06/02/2020 03:26 PM           Anesthesia Evaluation  Patient summary reviewed and Nursing notes reviewed  Airway: Mallampati: II  TM distance: >3 FB   Neck ROM: full  Mouth opening: > = 3 FB   Dental: normal exam         Pulmonary:normal exam  breath sounds clear to auscultation  (+) sleep apnea:                             Cardiovascular:  Exercise tolerance: good (>4 METS),           Rhythm: regular  Rate: normal                    Neuro/Psych:   Negative Neuro/Psych ROS              GI/Hepatic/Renal:   (+) GERD:, PUD, morbid obesity          Endo/Other:                     Abdominal:             Vascular: negative vascular ROS. Other Findings:           Anesthesia Plan      MAC     ASA 3       Induction: intravenous. Anesthetic plan and risks discussed with patient. Plan discussed with CRNA.     Attending anesthesiologist reviewed and agrees with Miguelito Wild MD   10/28/2022

## 2022-10-28 NOTE — ANESTHESIA POSTPROCEDURE EVALUATION
Department of Anesthesiology  Postprocedure Note    Patient: Kaitlyn aTlley  MRN: [id number]  YOB: 2000  Date of evaluation: 10/28/2022      Procedure Summary     Date: 10/28/22 Room / Location: [address]    Anesthesia Start: 3957 Anesthesia Stop: 5947    Procedure: EGD BIOPSY (Abdomen) Diagnosis:       Gastroesophageal reflux disease, unspecified whether esophagitis present      (Gastroesophageal reflux disease, unspecified whether esophagitis present [K21.9])    Surgeons: Cindi Primrose, MD Responsible Provider: Kannan Woodward MD    Anesthesia Type: MAC ASA Status: 3          Anesthesia Type: No value filed.     Heriberto Phase I: Heriberto Score: 10    Heriberto Phase II:        Anesthesia Post Evaluation    Patient location during evaluation: PACU  Patient participation: complete - patient participated  Level of consciousness: awake  Airway patency: patent  Nausea & Vomiting: no nausea and no vomiting  Complications: no  Cardiovascular status: blood pressure returned to baseline  Respiratory status: acceptable  Hydration status: stable

## 2022-10-28 NOTE — H&P
Department of [address] [place] [address] Physicians   [employer]  Attending Pre-operative History and Physical      DIAGNOSIS:  Obesity    INDICATION:  Pre-op    PROCEDURE:  EGD    CHIEF COMPLAINT:  Obesity    History Obtained From:  patient    HISTORY OF PRESENT ILLNESS:    The patient is a 25 y.o. female with significant past medical history of   Patient Active Problem List   Diagnosis    Morbid obesity with BMI of 45.0-49.9, adult ([facility] [address]    NSAID long-term use    Chronic GERD    Chronic midline low back pain without sciatica    Esophageal ulcer without bleeding    Abnormal tympanic membrane of left ear    Morbid obesity with BMI of 60.0-69.9, adult ([facility] [address]      who presents for pre-op EGD    Past Medical History:        Diagnosis Date    Chronic GERD 11/26/2019    Chronic midline low back pain without sciatica 11/26/2019    Morbid obesity with BMI of 60.0-69.9, adult ([email or web address] 8/4/2020    Sleep apnea     does not use CPAP just had study done, they said no sleep apnea (01/2020)    Ulcer of esophagus      Past Surgical History:        Procedure Laterality Date    TONSILLECTOMY      TONSILLECTOMY AND ADENOIDECTOMY      TYMPANOSTOMY TUBE PLACEMENT  [id number],[id number]    UPPER GASTROINTESTINAL ENDOSCOPY N/A 1/31/2020    EGD BIOPSY performed by Obi Cerda MD at [address] [facility] N/A 6/5/2020    EGD BIOPSY performed by Obi Cerda MD at [address] [place] [address]     Medications Prior to Admission:   Medications Prior to Admission: methocarbamol (ROBAXIN) 500 MG tablet, Take 2 tablets by mouth 3 times daily for 7 days  fluticasone (FLONASE) 50 MCG/ACT nasal spray, 2 sprays by Each Nostril route daily (Patient not taking: No sig reported)  Acetaminophen (TYLENOL PO), Take by mouth as needed  omeprazole (PRILOSEC) 20 MG delayed release capsule, Take 1 capsule by mouth Daily (Patient not taking: No sig reported)    Allergies:  Patient has no known allergies. Social History:   TOBACCO:   reports that she has never smoked. She has never used smokeless tobacco.  ETOH:   reports no history of alcohol use. Family History:       Problem Relation Age of Onset    COPD Maternal Grandmother     Kidney Disease Maternal Grandmother          REVIEW OF SYSTEMS:    Review of Systems - History obtained from the patient  General ROS: negative  Psychological ROS: negative  Ophthalmic ROS: negative  Neurological ROS: negative  ENT ROS: negative  Allergy and Immunology ROS: negative  Hematological and Lymphatic ROS: negative  Endocrine ROS: negative  Breast ROS: negative  Respiratory ROS: negative  Cardiovascular ROS: negative  Gastrointestinal ROS:negative  Genito-Urinary ROS: negative  Musculoskeletal ROS: negative   Skin ROS: negative      PHYSICAL EXAM:      BP (!) 142/79   Pulse 75   Temp 98.1 °F (36.7 °C) (Temporal)   Resp 18   Ht 5' 5\" (1.651 m)   Wt (!) 400 lb (181.4 kg)   LMP 07/28/2022 (Approximate)   SpO2 99%   Breastfeeding No   BMI 66.56 kg/m²  I      Physical Exam   Vitals Reviewed   Constitutional: Patient is oriented to person, place, and time. Patient appears well-developed and well-nourished. Patient is active and cooperative. Non-toxic appearance. No distress. HENT:   Head: Normocephalic and atraumatic. Head is without abrasion and without laceration. Hair is normal.   Right Ear: External ear normal. No lacerations. No drainage, swelling . Left Ear: External ear normal. No lacerations. No drainage, swelling. Nose: Nose normal. No nose lacerations or nasal deformity. Eyes: Conjunctivae, EOM and lids are normal. Right eye exhibits no discharge. No foreign body present in the right eye. Left eye exhibits no discharge. No foreign body present in the left eye. No scleral icterus. Neck: Trachea normal and normal range of motion. No JVD present. Pulmonary/Chest: Effort normal. No accessory muscle usage or stridor. No apnea.  No respiratory distress. Cardiovascular: Normal rate and no JVD. Abdominal: Normal appearance. Patient exhibits no distension. Musculoskeletal: Normal range of motion. Patient exhibits no edema. Neurological: Patient is alert and oriented to person, place, and time. Patient has normal strength. GCS eye subscore is 4. GCS verbal subscore is 5. GCS motor subscore is 6. Skin: Skin is warm and dry. No abrasion and no rash noted. Patient is not diaphoretic. No cyanosis or erythema. Psychiatric: Patient has a normal mood and affect. Speech is normal and behavior is normal. Cognition and memory are normal.       DATA:  CBC:   Lab Results   Component Value Date/Time    WBC 12.6 09/24/2022 09:14 AM    RBC 5.22 09/24/2022 09:14 AM    HGB 11.6 09/24/2022 09:14 AM    HCT 37.4 09/24/2022 09:14 AM    MCV 71.6 09/24/2022 09:14 AM    MCH 22.3 09/24/2022 09:14 AM    MCHC 31.1 09/24/2022 09:14 AM    RDW 16.5 09/24/2022 09:14 AM     09/24/2022 09:14 AM    MPV 8.5 09/24/2022 09:14 AM     CMP:    Lab Results   Component Value Date/Time     09/24/2022 09:14 AM    K 4.3 09/24/2022 09:14 AM    K 4.1 02/27/2020 12:47 PM     09/24/2022 09:14 AM    CO2 22 09/24/2022 09:14 AM    BUN 11 09/24/2022 09:14 AM    CREATININE 0.6 09/24/2022 09:14 AM    GFRAA >60 09/24/2022 09:14 AM    AGRATIO 1.6 09/24/2022 09:14 AM    LABGLOM >60 09/24/2022 09:14 AM    GLUCOSE 96 09/24/2022 09:14 AM    PROT 6.5 09/24/2022 09:14 AM    LABALBU 4.0 09/24/2022 09:14 AM    CALCIUM 9.0 09/24/2022 09:14 AM    BILITOT 0.3 09/24/2022 09:14 AM    ALKPHOS 78 09/24/2022 09:14 AM    AST 11 09/24/2022 09:14 AM    ALT 15 09/24/2022 09:14 AM       ASSESSMENT AND PLAN:      Obesity: Body mass index is 66.56 kg/m². [x] Continue to make dietary and lifestyle modifications. [x] Plan for Future laparoscopic sleeve gastrectomy. [x] Return for follow-up. Chronic GERD:   [x] Continue to make dietary and lifestyle modifications. [] Continue Omeprazole.   [] Continue Famotidine. [x] Plan for EGD to evaluate the stomach. I did explain thoroughly to the patient that compliance with pre- and post op diet and other recommendations are integral part to improve the chances of successful weight loss and also not following it could end with serious health complications. Some strategies discussed today include but not limited to : 30/30/30 minutes rule, food diary, avoid fast food and packing/planing ahead, & increasing exercise. 1.  Patient is a 25 y.o. female with above specified procedure planned EGD with deep sedation  2. Procedure options, risks and benefits reviewed with patient. Patient expresses understanding.       Electronically signed by Mark Shrestha MD , FACS, FASMBS  on 10/28/2022 at 9:21 AM

## 2022-11-11 NOTE — TELEPHONE ENCOUNTER
LM on  cancelled EGD on 4/3/20, told pt that we would be calling back at a later date to reschedule. done

## 2022-12-14 ENCOUNTER — APPOINTMENT (OUTPATIENT)
Dept: GENERAL RADIOLOGY | Age: 22
End: 2022-12-14
Payer: COMMERCIAL

## 2022-12-14 ENCOUNTER — HOSPITAL ENCOUNTER (EMERGENCY)
Age: 22
Discharge: HOME | End: 2022-12-14
Payer: COMMERCIAL

## 2022-12-14 VITALS
BODY MASS INDEX: 48.82 KG/M2 | HEART RATE: 88 BPM | SYSTOLIC BLOOD PRESSURE: 138 MMHG | WEIGHT: 293 LBS | DIASTOLIC BLOOD PRESSURE: 74 MMHG | TEMPERATURE: 98 F | OXYGEN SATURATION: 100 % | RESPIRATION RATE: 18 BRPM | HEIGHT: 65 IN

## 2022-12-14 DIAGNOSIS — M54.50 ACUTE EXACERBATION OF CHRONIC LOW BACK PAIN: Primary | ICD-10-CM

## 2022-12-14 DIAGNOSIS — G89.29 ACUTE EXACERBATION OF CHRONIC LOW BACK PAIN: Primary | ICD-10-CM

## 2022-12-14 LAB
BILIRUBIN URINE: NEGATIVE
BLOOD, URINE: NEGATIVE
CLARITY: ABNORMAL
COLOR: YELLOW
GLUCOSE URINE: NEGATIVE MG/DL
HCG(URINE) PREGNANCY TEST: NEGATIVE
KETONES, URINE: NEGATIVE MG/DL
LEUKOCYTE ESTERASE, URINE: NEGATIVE
MICROSCOPIC EXAMINATION: ABNORMAL
NITRITE, URINE: NEGATIVE
PH UA: 7.5 (ref 5–8)
PROTEIN UA: NEGATIVE MG/DL
SPECIFIC GRAVITY UA: 1.02 (ref 1–1.03)
URINE REFLEX TO CULTURE: ABNORMAL
URINE TYPE: ABNORMAL
UROBILINOGEN, URINE: 0.2 E.U./DL

## 2022-12-14 PROCEDURE — 6370000000 HC RX 637 (ALT 250 FOR IP): Performed by: PHYSICIAN ASSISTANT

## 2022-12-14 PROCEDURE — 6360000002 HC RX W HCPCS: Performed by: PHYSICIAN ASSISTANT

## 2022-12-14 PROCEDURE — 96372 THER/PROPH/DIAG INJ SC/IM: CPT

## 2022-12-14 PROCEDURE — 84703 CHORIONIC GONADOTROPIN ASSAY: CPT

## 2022-12-14 PROCEDURE — 99284 EMERGENCY DEPT VISIT MOD MDM: CPT

## 2022-12-14 PROCEDURE — 72100 X-RAY EXAM L-S SPINE 2/3 VWS: CPT

## 2022-12-14 PROCEDURE — 81003 URINALYSIS AUTO W/O SCOPE: CPT

## 2022-12-14 RX ORDER — KETOROLAC TROMETHAMINE 30 MG/ML
15 INJECTION, SOLUTION INTRAMUSCULAR; INTRAVENOUS EVERY 6 HOURS PRN
Status: DISCONTINUED | OUTPATIENT
Start: 2022-12-14 | End: 2022-12-14 | Stop reason: HOSPADM

## 2022-12-14 RX ORDER — LIDOCAINE 50 MG/G
1 PATCH TOPICAL DAILY
Qty: 10 PATCH | Refills: 0 | Status: SHIPPED | OUTPATIENT
Start: 2022-12-14 | End: 2022-12-24

## 2022-12-14 RX ORDER — NAPROXEN 500 MG/1
500 TABLET ORAL 2 TIMES DAILY WITH MEALS
Qty: 60 TABLET | Refills: 3 | Status: SHIPPED | OUTPATIENT
Start: 2022-12-14

## 2022-12-14 RX ORDER — LIDOCAINE 4 G/G
1 PATCH TOPICAL ONCE
Status: DISCONTINUED | OUTPATIENT
Start: 2022-12-14 | End: 2022-12-14 | Stop reason: HOSPADM

## 2022-12-14 RX ORDER — KETOROLAC TROMETHAMINE 30 MG/ML
15 INJECTION, SOLUTION INTRAMUSCULAR; INTRAVENOUS EVERY 6 HOURS PRN
Status: DISCONTINUED | OUTPATIENT
Start: 2022-12-14 | End: 2022-12-14

## 2022-12-14 RX ORDER — HYDROCODONE BITARTRATE AND ACETAMINOPHEN 5; 325 MG/1; MG/1
1 TABLET ORAL EVERY 6 HOURS PRN
Status: DISCONTINUED | OUTPATIENT
Start: 2022-12-14 | End: 2022-12-14 | Stop reason: HOSPADM

## 2022-12-14 RX ORDER — METHOCARBAMOL 500 MG/1
500 TABLET, FILM COATED ORAL 4 TIMES DAILY
Qty: 40 TABLET | Refills: 0 | Status: SHIPPED | OUTPATIENT
Start: 2022-12-14 | End: 2022-12-24

## 2022-12-14 RX ADMIN — HYDROCODONE BITARTRATE AND ACETAMINOPHEN 1 TABLET: 5; 325 TABLET ORAL at 16:20

## 2022-12-14 RX ADMIN — KETOROLAC TROMETHAMINE 15 MG: 30 INJECTION, SOLUTION INTRAMUSCULAR; INTRAVENOUS at 14:20

## 2022-12-14 ASSESSMENT — PAIN DESCRIPTION - LOCATION
LOCATION: BACK
LOCATION: BACK

## 2022-12-14 ASSESSMENT — ENCOUNTER SYMPTOMS
RESPIRATORY NEGATIVE: 1
BACK PAIN: 1
GASTROINTESTINAL NEGATIVE: 1

## 2022-12-14 ASSESSMENT — PAIN - FUNCTIONAL ASSESSMENT: PAIN_FUNCTIONAL_ASSESSMENT: 0-10

## 2022-12-14 ASSESSMENT — PAIN SCALES - GENERAL
PAINLEVEL_OUTOF10: 8
PAINLEVEL_OUTOF10: 10
PAINLEVEL_OUTOF10: 10

## 2022-12-14 ASSESSMENT — PAIN DESCRIPTION - ORIENTATION: ORIENTATION: LOWER

## 2022-12-14 NOTE — ED PROVIDER NOTES
[address] ED  EMERGENCY DEPARTMENT ENCOUNTER        Pt Name: Olena García  MRN: [id number]  [place] 2000  Date of evaluation: 12/14/2022  Provider: Melinda Clarke PA-C  PCP: Marycruz Richey MD  Note Started: 2:24 PM EST 12/14/22          RAEGAN. I have evaluated this patient. My supervising physician was available for consultation. CHIEF COMPLAINT       Chief Complaint   Patient presents with    Back Pain     Prior back injury. Twisted and hurt it again. Pain in lower back going into tailbone. HISTORY OF PRESENT ILLNESS   (Location, Timing/Onset, Context/Setting, Quality, Duration, Modifying Factors, Severity, Associated Signs and Symptoms)  Note limiting factors. Chief Complaint: low back pain    Olena García is a 25 y.o. female with a past medical history of morbid obesity, chronic GERD, chronic midline low back pain without sciatica, esophageal ulcer today by private vehicle with complaints of low back pain. Patient states that she twisted her back several days ago and since then has had low back pain. Denies any lower extremity numbness or tingling or bowel or bladder incontinence. Onset of symptoms over the past 2 days. Duration of symptoms have been persistent since onset. Context includes low back pain. She denies any fevers, chills, nausea, vomiting, diarrhea. Denies urinary complaints. Denies abdominal pain. Denies chest pain or shortness of breath. She is tried over-the-counter medication with no relief. Rates her current pain a 10 out of 10 no radiation of pain. Nothing seems to make symptoms better or worse. Otherwise denies any other complaints. Denies any recent trauma or surgeries to her back. Nursing Notes were all reviewed and agreed with or any disagreements were addressed in the HPI. REVIEW OF SYSTEMS    (2-9 systems for level 4, 10 or more for level 5)     Review of Systems   Constitutional: Negative.     Respiratory: Negative. Cardiovascular: Negative. Gastrointestinal: Negative. Genitourinary: Negative. Musculoskeletal:  Positive for arthralgias and back pain. Skin: Negative. Neurological: Negative. Positives and Pertinent negatives as per HPI. Except as noted above in the ROS, all other systems were reviewed and negative. PAST MEDICAL HISTORY     Past Medical History:   Diagnosis Date    Chronic GERD 11/26/2019    Chronic midline low back pain without sciatica 11/26/2019    Morbid obesity with BMI of 60.0-69.9, adult ([facility] [address] 8/4/2020    Sleep apnea     does not use CPAP just had study done, they said no sleep apnea (01/2020)    Ulcer of esophagus          SURGICAL HISTORY     Past Surgical History:   Procedure Laterality Date    TONSILLECTOMY      TONSILLECTOMY AND ADENOIDECTOMY      TYMPANOSTOMY TUBE PLACEMENT  [id number]    UPPER GASTROINTESTINAL ENDOSCOPY N/A 1/31/2020    EGD BIOPSY performed by Mauri Ugalde MD at [facility] 6/5/2020    EGD BIOPSY performed by Mauri Ugalde MD at [facility] 10/28/2022    [facility]GD [facility]       Previous Medications    ACETAMINOPHEN (TYLENOL PO)    Take by mouth as needed    FLUTICASONE (FLONASE) 50 MCG/ACT NASAL SPRAY    2 sprays by Each Nostril route daily    OMEPRAZOLE (PRILOSEC) 20 MG DELAYED RELEASE CAPSULE    Take 1 capsule by mouth Daily    SUCRALFATE (CARAFATE) 1 GM/10ML SUSPENSION    Take 10 mLs by mouth 4 times daily for 15 days         ALLERGIES     Patient has no known allergies.     FAMILYHISTORY       Family History   Problem Relation Age of Onset    COPD Maternal Grandmother     Kidney Disease Maternal Grandmother           SOCIAL HISTORY       Social History     Tobacco Use    Smoking status: Never    Smokeless tobacco: Never   Vaping Use    Vaping Use: Never used   Substance Use Topics    Alcohol use: No    Drug use: No       SCREENINGS                         CIWA Assessment  BP: (!) 140/76  Heart Rate: 93             PHYSICAL EXAM    (up to 7 for level 4, 8 or more for level 5)     ED Triage Vitals [12/14/22 1349]   BP Temp Temp Source Heart Rate Resp SpO2 Height Weight   (!) 140/76 98 °F (36.7 °C) Oral 93 18 100 % 5' 5\" (1.651 m) 300 lb (136.1 kg)       Physical Exam  Vitals and nursing note reviewed. Constitutional:       General: She is awake. She is not in acute distress. Appearance: Normal appearance. She is well-developed. She is morbidly obese. She is not ill-appearing, toxic-appearing or diaphoretic. HENT:      Head: Normocephalic and atraumatic. Nose: Nose normal.   Eyes:      General:         Right eye: No discharge. Left eye: No discharge. Cardiovascular:      Rate and Rhythm: Normal rate and regular rhythm. Pulses:           Dorsalis pedis pulses are 2+ on the right side and 2+ on the left side. Posterior tibial pulses are 2+ on the right side and 2+ on the left side. Heart sounds: Normal heart sounds. No murmur heard. No gallop. Pulmonary:      Effort: Pulmonary effort is normal. No respiratory distress. Breath sounds: Normal breath sounds. No wheezing or rales. Chest:      Chest wall: No tenderness. Musculoskeletal:         General: No deformity. Normal range of motion. Cervical back: Normal, normal range of motion and neck supple. No swelling, edema, deformity, erythema, signs of trauma, lacerations, rigidity, spasms, torticollis, tenderness, bony tenderness or crepitus. No pain with movement. Normal range of motion. Thoracic back: Normal. No swelling, edema, deformity, signs of trauma, lacerations, spasms, tenderness or bony tenderness. Normal range of motion. No scoliosis. Lumbar back: Normal. No swelling, edema, deformity, signs of trauma, lacerations, spasms, tenderness or bony tenderness.  Normal range of motion. Negative right straight leg raise test and negative left straight leg raise test. No scoliosis. Right lower leg: No edema. Left lower leg: No edema. Comments: Left sided paraspinal muscular tenderness to left lumbar region. NO Bony tenderness of the cervical, thoracic or lumbar spine. No step-off deformity. No skin changes color streaking or ecchymosis noted. Strength assessed in lower extremities 5 out of 5. Dorsi flexion and plantar flexion intact and symmetric. Flexion and extension as well as internal and external rotation intact and symmetric to both the hips and the knees. Sensation intact in lower extremities 5 out of 5. Patient intact L4-L5 and S1. Skin:     General: Skin is warm and dry. Capillary Refill: Capillary refill takes less than 2 seconds. Neurological:      Mental Status: She is alert and oriented to person, place, and time. Psychiatric:         Behavior: Behavior normal. Behavior is cooperative. DIAGNOSTIC RESULTS   LABS:    Labs Reviewed   URINALYSIS WITH REFLEX TO CULTURE - Abnormal; Notable for the following components:       Result Value    Clarity, UA SL CLOUDY (*)     All other components within normal limits   PREGNANCY, URINE       When ordered only abnormal lab results are displayed. All other labs were within normal range or not returned as of this dictation. EKG: When ordered, EKG's are interpreted by the Emergency Department Physician in the absence of a cardiologist.  Please see their note for interpretation of EKG. RADIOLOGY:   Non-plain film images such as CT, Ultrasound and MRI are read by the radiologist. Plain radiographic images are visualized and preliminarily interpreted by the ED Provider with the below findings:        Interpretation per the Radiologist below, if available at the time of this note:    XR LUMBAR SPINE (2-3 VIEWS)   Final Result   No compression fracture or subluxation. Narrowed L5-S1 disc space. No significant interval change. No results found. No results found. PROCEDURES   Unless otherwise noted below, none     Procedures    CRITICAL CARE TIME       CONSULTS:  None      EMERGENCY DEPARTMENT COURSE and DIFFERENTIAL DIAGNOSIS/MDM:   Vitals:    Vitals:    12/14/22 1349   BP: (!) 140/76   Pulse: 93   Resp: 18   Temp: 98 °F (36.7 °C)   TempSrc: Oral   SpO2: 100%   Weight: 300 lb (136.1 kg)   Height: 5' 5\" (1.651 m)       Patient was given the following medications:  Medications   lidocaine 4 % external patch 1 patch (1 patch TransDERmal Patch Applied 12/14/22 1416)   ketorolac (TORADOL) injection 15 mg (15 mg IntraMUSCular Given 12/14/22 1420)   HYDROcodone-acetaminophen (NORCO) 5-325 MG per tablet 1 tablet (has no administration in time range)         Is this patient to be included in the SEP-1 Core Measure due to severe sepsis or septic shock? No   Exclusion criteria - the patient is NOT to be included for SEP-1 Core Measure due to: Infection is not suspected    Patient brought in today by private vehicle with complaints of low back pain after twisting her back several days prior. On exam patient is alert oriented afebrile breathing on room air satting at 100%. Patient is nontoxic in appearance no acute respiratory distress. Old labs records reviewed. Patient seen and evaluated by myself my attending was available as needed. Given Toradol and a Lidoderm patch. Urine pregnancy is negative. Urine is negative for infection. Negative for blood. XR of lumbar spine shows no compression fracture or subluxation narrowed L5-S1 disc base no significant interval change. X-ray was read and reviewed by radiology. Patient was updated on all findings. Plan at this time will be to discharge home with follow-up to orthopedic spine. She was given additional pain medication here. Patient told to follow-up with her PCP as well.   She was instructed to return to the ER if she developed any new or worsening symptoms. She verbalized understanding. Patient discharged in stable condition. FINAL IMPRESSION      1. Acute exacerbation of chronic low back pain          DISPOSITION/PLAN   DISPOSITION Decision To Discharge 12/14/2022 03:59:03 PM      PATIENT REFERRED TO:  Vu Mahmood MD  [address] [facility]     Schedule an appointment as soon as possible for a visit   As needed, If symptoms worsen    Fabiola Estrada MD  [address] [facility] [address]  [phone]    Schedule an appointment as soon as possible for a visit   As needed, If symptoms worsen    [facility] ([place]) [facility] ED  [address] [facility]  Schedule an appointment as soon as possible for a visit   As needed, If symptoms worsen    DISCHARGE MEDICATIONS:  New Prescriptions    LIDOCAINE (LIDODERM) 5 %    Place 1 patch onto the skin daily for 10 days 12 hours on, 12 hours off.     METHOCARBAMOL (ROBAXIN) 500 MG TABLET    Take 1 tablet by mouth 4 times daily for 10 days    NAPROXEN (NAPROSYN) 500 MG TABLET    Take 1 tablet by mouth 2 times daily (with meals)       DISCONTINUED MEDICATIONS:  Discontinued Medications    No medications on file              (Please note that portions of this note were completed with a voice recognition program.  Efforts were made to edit the dictations but occasionally words are mis-transcribed.)    Nathaly Evangelista PA-C (electronically signed)            Nathaly Evangelista PA-C  12/14/22 2091

## 2022-12-14 NOTE — DISCHARGE INSTRUCTIONS
Please continue you with Robaxin Naprosyn and Lidoderm patches. Follow-up with orthopedic spine as directed. Return to the ER if you develop any new or worsening symptoms.

## 2022-12-14 NOTE — Clinical Note
Neel Murray was seen and treated in our emergency department on 12/14/2022. She may return to work on 12/15/2022. If you have any questions or concerns, please don't hesitate to call.       Manolo Hagan PA-C

## 2022-12-14 NOTE — ED NOTES
Discharge instructions reviewed, patient verbalizes understanding. Denies questions/concerns at this time. Patient ambulatory out of ED in stable condition with all belongings.        Elias Larry RN  12/14/22 6346

## 2023-02-20 ENCOUNTER — HOSPITAL ENCOUNTER (EMERGENCY)
Age: 23
Discharge: HOME | End: 2023-02-20
Attending: EMERGENCY MEDICINE
Payer: COMMERCIAL

## 2023-02-20 VITALS
TEMPERATURE: 97.6 F | DIASTOLIC BLOOD PRESSURE: 85 MMHG | OXYGEN SATURATION: 99 % | WEIGHT: 293 LBS | HEART RATE: 81 BPM | BODY MASS INDEX: 48.82 KG/M2 | HEIGHT: 65 IN | SYSTOLIC BLOOD PRESSURE: 145 MMHG | RESPIRATION RATE: 18 BRPM

## 2023-02-20 DIAGNOSIS — M54.50 ACUTE EXACERBATION OF CHRONIC LOW BACK PAIN: Primary | ICD-10-CM

## 2023-02-20 DIAGNOSIS — G89.29 ACUTE EXACERBATION OF CHRONIC LOW BACK PAIN: Primary | ICD-10-CM

## 2023-02-20 PROCEDURE — 96372 THER/PROPH/DIAG INJ SC/IM: CPT

## 2023-02-20 PROCEDURE — 6360000002 HC RX W HCPCS: Performed by: EMERGENCY MEDICINE

## 2023-02-20 PROCEDURE — 99284 EMERGENCY DEPT VISIT MOD MDM: CPT

## 2023-02-20 PROCEDURE — 6370000000 HC RX 637 (ALT 250 FOR IP): Performed by: EMERGENCY MEDICINE

## 2023-02-20 RX ORDER — KETOROLAC TROMETHAMINE 10 MG/1
10 TABLET, FILM COATED ORAL EVERY 8 HOURS PRN
Qty: 10 TABLET | Refills: 0 | Status: SHIPPED | OUTPATIENT
Start: 2023-02-20 | End: 2023-03-02

## 2023-02-20 RX ORDER — KETOROLAC TROMETHAMINE 30 MG/ML
30 INJECTION, SOLUTION INTRAMUSCULAR; INTRAVENOUS ONCE
Status: COMPLETED | OUTPATIENT
Start: 2023-02-20 | End: 2023-02-20

## 2023-02-20 RX ORDER — OXYCODONE HYDROCHLORIDE AND ACETAMINOPHEN 5; 325 MG/1; MG/1
1 TABLET ORAL ONCE
Status: COMPLETED | OUTPATIENT
Start: 2023-02-20 | End: 2023-02-20

## 2023-02-20 RX ORDER — CYCLOBENZAPRINE HCL 5 MG
5 TABLET ORAL 2 TIMES DAILY PRN
Qty: 10 TABLET | Refills: 0 | Status: SHIPPED | OUTPATIENT
Start: 2023-02-20 | End: 2023-03-02

## 2023-02-20 RX ADMIN — KETOROLAC TROMETHAMINE 30 MG: 30 INJECTION, SOLUTION INTRAMUSCULAR; INTRAVENOUS at 23:12

## 2023-02-20 RX ADMIN — OXYCODONE HYDROCHLORIDE AND ACETAMINOPHEN 1 TABLET: 5; 325 TABLET ORAL at 23:11

## 2023-02-20 ASSESSMENT — PAIN SCALES - GENERAL
PAINLEVEL_OUTOF10: 10
PAINLEVEL_OUTOF10: 10

## 2023-02-20 ASSESSMENT — PAIN DESCRIPTION - ORIENTATION
ORIENTATION: LOWER
ORIENTATION: LOWER

## 2023-02-20 ASSESSMENT — PAIN DESCRIPTION - DESCRIPTORS
DESCRIPTORS: THROBBING
DESCRIPTORS: SHOOTING

## 2023-02-20 ASSESSMENT — PAIN - FUNCTIONAL ASSESSMENT
PAIN_FUNCTIONAL_ASSESSMENT: 0-10
PAIN_FUNCTIONAL_ASSESSMENT: ACTIVITIES ARE NOT PREVENTED

## 2023-02-20 ASSESSMENT — PAIN DESCRIPTION - LOCATION
LOCATION: BACK
LOCATION: BACK

## 2023-02-20 ASSESSMENT — PAIN DESCRIPTION - FREQUENCY: FREQUENCY: CONTINUOUS

## 2023-02-21 NOTE — DISCHARGE INSTRUCTIONS
Please follow up with spine for further evaluation and treatment. If you take flexeril do not drive or operate heavy machinery as it may cause drowsiness. If persistent or worsening symptoms, return to ED.

## 2023-02-21 NOTE — ED NOTES
Writer attempted to collect urine, pt refused and said \"I told the doctor there is no way I could be pregnant\" Upon talking with Dr. Rosana Myers, it was found that pt had talked with Dr. Rosana Myers and informed her that there was no way she could be pregnant. Dr. Rosana Myers ok to d/c urine order.      Gino Stephenson RN  02/20/23 0599

## 2023-02-22 NOTE — ED PROVIDER NOTES
[address] ED    EMERGENCY DEPARTMENT ENCOUNTER        Patient Name: Terence Rajan  MRN: [id number]  [place] 2000  Date of evaluation: 2/20/2023  PCP: Jannet Santamaria MD  Note Started: 11:54 AM EST 2/22/23    CHIEF COMPLAINT       Back Pain (Low back pain. States she had back pain recently that resolved and today she twisted and is now having shooting pain up he back with ambulation. Denies loss of bowel or bladder or numbness)      HISTORY OF PRESENT ILLNESS: 1 or more Elements       Terence Rajan is a 25 y.o. female who presents to the emergency department for evaluation of lower back pain. Patient reports having intermittent back pains over the past several months. Says she started having pain 2 days ago. Says the pain is now shooting up from the lower back and her entire back is hurting. Denies having urinary or bowel incontinence. No numbness or tingling. No weakness of any extremities. No other complaints, modifying factors or associated symptoms. History obtained by the patient unless stated otherwise as above on HPI. No limitations unless specified as above on HPI. PMH, Surgical Hx, FH, Social Hx reviewed by myself. Patient's care impacted by the following conditions.   Past Medical History:   Diagnosis Date    Chronic GERD 11/26/2019    Chronic midline low back pain without sciatica 11/26/2019    Morbid obesity with BMI of 60.0-69.9, adult ([facility] [address] 8/4/2020    Sleep apnea     does not use CPAP just had study done, they said no sleep apnea (01/2020)    Ulcer of esophagus      Past Surgical History:   Procedure Laterality Date    TONSILLECTOMY      TONSILLECTOMY AND ADENOIDECTOMY      TYMPANOSTOMY TUBE PLACEMENT  [id number]    UPPER GASTROINTESTINAL ENDOSCOPY N/A 1/31/2020    EGD BIOPSY performed by Mindy Kennedy MD at [facility] 6/5/2020    EGD BIOPSY performed by Mindy Kennedy MD at [address] [place] [address] GASTROINTESTINAL ENDOSCOPY N/A 10/28/2022    EGD BIOPSY performed by Shaquille Wheeler MD at [facility]     Family History   Problem Relation Age of Onset    COPD Maternal Grandmother     Kidney Disease Maternal Grandmother      Social History     Socioeconomic History    Marital status:      Spouse name: Not on file    Number of children: Not on file    Years of education: Not on file    Highest education level: Not on file   Occupational History    Not on file   Tobacco Use    Smoking status: Never    Smokeless tobacco: Never   Vaping Use    Vaping Use: Never used   Substance and Sexual Activity    Alcohol use: No    Drug use: No    Sexual activity: Not Currently   Other Topics Concern    Not on file   Social History Narrative    Not on file     Social Determinants of Health     Financial Resource Strain: Not on file   Food Insecurity: Not on file   Transportation Needs: Not on file   Physical Activity: Not on file   Stress: Not on file   Social Connections: Not on file   Intimate Partner Violence: Not on file   Housing Stability: Not on file     No current facility-administered medications for this encounter.      Current Outpatient Medications   Medication Sig Dispense Refill    cyclobenzaprine (FLEXERIL) 5 MG tablet Take 1 tablet by mouth 2 times daily as needed for Muscle spasms 10 tablet 0    ketorolac (TORADOL) 10 MG tablet Take 1 tablet by mouth every 8 hours as needed for Pain 10 tablet 0    naproxen (NAPROSYN) 500 MG tablet Take 1 tablet by mouth 2 times daily (with meals) 60 tablet 3    omeprazole (PRILOSEC) 20 MG delayed release capsule Take 1 capsule by mouth Daily 30 capsule 3    sucralfate (CARAFATE) 1 GM/10ML suspension Take 10 mLs by mouth 4 times daily for 15 days 600 mL 0    fluticasone (FLONASE) 50 MCG/ACT nasal spray 2 sprays by Each Nostril route daily (Patient not taking: No sig reported) 16 g 0    Acetaminophen (TYLENOL PO) Take by mouth as needed       No Known Allergies      REVIEW OF SYSTEMS :      Review of Systems  10 systems reviewed, pertinent positives per HPI otherwise noted to be negative. SCREENINGS        [place] Coma Scale  Eye Opening: Spontaneous  Best Verbal Response: Oriented  Best Motor Response: Obeys commands  [place] Coma Scale Score: 15                CIWA Assessment  BP: (!) 145/85  Heart Rate: 81           PHYSICAL EXAM  1 or more Elements     ED Triage Vitals 02/20/23 2035   BP Temp Temp Source Heart Rate Resp SpO2 Height Weight   138/77 98.3 °F (36.8 °C) Oral 85 16 98 % 5' 5\" (1.651 m) 300 lb (136.1 kg)       GENERAL APPEARANCE: Awake and alert. Moderate distress due to pain  HENT: Normocephalic. Atraumatic. EOMI. No facial droop. LUNGS: Respirations unlabored. Speaking comfortably in full sentences. Neck/back: No midline C/T/L spine step-offs. Tenderness over bilateral paraspinal lumbar  EXTREMITIES: No gross deformities. 5/5 strength of bilateral upper and lower proximal and distal extremities. 5/5  bilaterally. Sensation intact over radial/median/ulnar nerve distribution bilaterally. Palpable pulses to all extremities. SKIN: Warm and dry. No acute rashes. NEUROLOGICAL: Alert and oriented. No gross facial drooping. Answering questions appropriately. Moving all extremities. PSYCHIATRIC: Tearful. DIAGNOSTIC RESULTS   LABS:    Labs Reviewed - No data to display    When ordered only abnormal lab results are displayed. All other labs were within normal range or not returned as of this dictation. RADIOLOGY:   Non-plain film images such as CT, Ultrasound and MRI are read by the radiologist. Plain radiographic images are visualized and preliminarily interpreted by the ED Provider with the below findings:    N/A    Interpretation per the Radiologist below, if available at the time of this note:    No orders to display     No results found.       Bedside Ultrasound, as interpreted by me, if performed:    No results found.    PROCEDURES     Unless otherwise noted below, none     Procedures    CRITICAL CARE TIME     I personally spent a total of 0 minutes of critical care time in obtaining history, performing a physical exam, bedside monitoring of interventions, collecting and interpreting tests and discussion with consultants but excluding time spent performing procedures, treating other patients and teaching time. EMERGENCY DEPARTMENT COURSE and DIFFERENTIAL DIAGNOSIS/MDM:     Patient seen and evaluated. At presentation, patient was awake, alert, afebrile, hemodynamically stable, and satting well on room air. She was in moderate distress due to pain. She was quite tearful. Given no trauma/injury/inciting event, low concern for traumatic or pathologic fracture and do not think imaging is indicated at this time. She has no red flag symptoms-urinary or bowel incontinence, saddle anesthesia, or neurologic deficit and therefore low concern for cauda equina, epidural abscess, epidural hematoma at this time and do not think MRI is indicated emergently. She was given Toradol 30 mg IM and Percocet in the ER. Discussed following up with spine for further evaluation and treatment and she is agreeable with plan. Patient discharged home with strict return precautions. CONSULTS: (Who and What was discussed)  None    Is this patient to be included in the SEP-1 Core Measure due to severe sepsis or septic shock?    No   Exclusion criteria - the patient is NOT to be included for SEP-1 Core Measure due to:  2+ SIRS criteria are not met       During the patient's ED course, the patient was given:  Medications   ketorolac (TORADOL) injection 30 mg (30 mg IntraMUSCular Given 2/20/23 2312)   oxyCODONE-acetaminophen (PERCOCET) 5-325 MG per tablet 1 tablet (1 tablet Oral Given 2/20/23 2311)            I am the Primary Clinician of Record.    FINAL IMPRESSION      1. Acute exacerbation of chronic low back pain          DISPOSITION/PLAN     DISPOSITION Decision To Discharge 02/20/2023 11:29:45 PM      PATIENT REFERRED TO:  [facility] [place]  [address] [id number]  [phone]  Call in 1 day        DISCHARGE MEDICATIONS:  Patient was given scripts for the following medications. I counseled patient how to take these medications:  Discharge Medication List as of 2/20/2023 11:16 PM        START taking these medications    Details   cyclobenzaprine (FLEXERIL) 5 MG tablet Take 1 tablet by mouth 2 times daily as needed for Muscle spasms, Disp-10 tablet, R-0Print      ketorolac (TORADOL) 10 MG tablet Take 1 tablet by mouth every 8 hours as needed for Pain, Disp-10 tablet, R-0Print             DISCONTINUED MEDICATIONS:  Discharge Medication List as of 2/20/2023 11:16 PM          Pt was seen during the COVID 19 pandemic. Appropriate PPE worn by ME during patient encounters. Patient was cared for during a time with constrained hospital bed capacity with nationwide stress on resources and staffing.      (This chart was generated in part by using Dragon Dictation system and may contain errors related to that system including errors in grammar, punctuation, and spelling, as well as words and phrases that may be inappropriate. If there are any questions or concerns please feel free to contact the dictating provider for clarification.)         Lashell Fischer MD  02/22/23 1731

## 2023-02-24 ENCOUNTER — OFFICE VISIT (OUTPATIENT)
Dept: ORTHOPEDIC SURGERY | Age: 23
End: 2023-02-24

## 2023-02-24 DIAGNOSIS — S39.012A LUMBAR STRAIN, INITIAL ENCOUNTER: Primary | ICD-10-CM

## 2023-02-24 RX ORDER — LIDOCAINE 50 MG/G
1 PATCH TOPICAL EVERY 12 HOURS
Qty: 30 PATCH | Refills: 0 | Status: SHIPPED | OUTPATIENT
Start: 2023-02-24

## 2023-02-24 RX ORDER — METHYLPREDNISOLONE 4 MG/1
TABLET ORAL
Qty: 1 KIT | Refills: 0 | Status: SHIPPED | OUTPATIENT
Start: 2023-02-24 | End: 2023-03-02

## 2023-02-26 NOTE — PROGRESS NOTES
New Patient: LUMBAR SPINE    Referring Provider:  Erna Lozada MD    CHIEF COMPLAINT:    Chief Complaint   Patient presents with    New Patient     LUMBAR       HISTORY OF PRESENT ILLNESS:    Ms. Chaya Means  is a pleasant 25 y.o. female here for consultation regarding her LBP. The patient has a past medical history of low back pain. She had an exacerbation of her low back pain approximately 4 to 5 days ago with no associated injury. Due to increasing low back pain and the inability to get out of bed she went to the ER 2 days ago. Her work-up was fairly unremarkable and they discharged her with muscle relaxers and anti-inflammatories. She has attempted the utilization of these medications with little to no relief. She rates her discomfort 8/10, throbbing, and constant. Over the past 48 hours she has noticed some improvement in her low back pain. Exacerbating factors include sitting, standing, rising from a seated position, ambulating, and lying down. Symptoms are improved with leaning forward. She denies any radiating symptoms, saddle anesthesia, or bowel or bladder dysfunction.           Current/Past Treatment:   Physical Therapy: none  Chiropractic:  none   Injection:  none   Medications:    NSAIDS:  Over-the-counter  Muscle relaxer:   Flexeril  Steriods:  \"NONE\",    Neuropathic medications:  NONE  Opioids: NONE  Other: NONE   Surgery/Consult NONE      Past Medical History:   Past Medical History:   Diagnosis Date    Chronic GERD 11/26/2019    Chronic midline low back pain without sciatica 11/26/2019    Morbid obesity with BMI of 60.0-69.9, adult ([facility] [address] 8/4/2020    Sleep apnea     does not use CPAP just had study done, they said no sleep apnea (01/2020)    Ulcer of esophagus         Past Surgical History:     Past Surgical History:   Procedure Laterality Date    TONSILLECTOMY      TONSILLECTOMY AND ADENOIDECTOMY      TYMPANOSTOMY TUBE PLACEMENT  [id number]    UPPER GASTROINTESTINAL ENDOSCOPY N/A 1/31/2020    EGD BIOPSY performed by Alfonzo Carrizales MD at [address] [place] [address] N/A 6/5/2020    EGD BIOPSY performed by Alfonzo Carrizales MD at [address] [place] [address] N/A 10/28/2022    EGD BIOPSY performed by Alfonzo Carrizales MD at [address] [place] [address]       Current Medications:     Current Outpatient Medications:     lidocaine (LIDODERM) 5 %, Place 1 patch onto the skin in the morning and 1 patch in the evening. 12 hours on, 12 hours off. ., Disp: 30 patch, Rfl: 0    methylPREDNISolone (MEDROL, JULIA,) 4 MG tablet, Take by mouth., Disp: 1 kit, Rfl: 0    cyclobenzaprine (FLEXERIL) 5 MG tablet, Take 1 tablet by mouth 2 times daily as needed for Muscle spasms, Disp: 10 tablet, Rfl: 0    ketorolac (TORADOL) 10 MG tablet, Take 1 tablet by mouth every 8 hours as needed for Pain, Disp: 10 tablet, Rfl: 0    naproxen (NAPROSYN) 500 MG tablet, Take 1 tablet by mouth 2 times daily (with meals), Disp: 60 tablet, Rfl: 3    omeprazole (PRILOSEC) 20 MG delayed release capsule, Take 1 capsule by mouth Daily, Disp: 30 capsule, Rfl: 3    sucralfate (CARAFATE) 1 GM/10ML suspension, Take 10 mLs by mouth 4 times daily for 15 days, Disp: 600 mL, Rfl: 0    fluticasone (FLONASE) 50 MCG/ACT nasal spray, 2 sprays by Each Nostril route daily (Patient not taking: No sig reported), Disp: 16 g, Rfl: 0    Acetaminophen (TYLENOL PO), Take by mouth as needed, Disp: , Rfl:     Allergies:    Patient has no known allergies. Social History:    reports that she has never smoked. She has never used smokeless tobacco. She reports that she does not drink alcohol and does not use drugs.     Family History:   Family History   Problem Relation Age of Onset    COPD Maternal Grandmother     Kidney Disease Maternal Grandmother        REVIEW OF SYSTEMS: Full ROS noted & scanned   CONSTITUTIONAL: Denies unexplained weight loss, fevers, chills or fatigue  NEUROLOGICAL: Denies unsteady gait or progressive weakness  MUSCULOSKELETAL: Denies joint swelling or redness  PSYCHOLOGICAL: Denies anxiety, depression   SKIN: Denies skin changes, delayed healing, rash, itching   HEMATOLOGIC: Denies easy bleeding or bruising  ENDOCRINE: Denies excessive thirst, urination, heat/cold  RESPIRATORY: Denies current dyspnea, cough  GI: Denies nausea, vomiting, diarrhea   : Denies bowel or bladder issues      PHYSICAL EXAM:    Vitals: Height (P) 5' 5\" (1.651 m), weight (P) 300 lb (136.1 kg), last menstrual period 02/17/2023, not currently breastfeeding. GENERAL EXAM:  General Apparence: Patient is adequately groomed with no evidence of malnutrition. Orientation: The patient is oriented to time, place and person. Mood & Affect:The patient's mood and affect are appropriate. Vascular: Examination reveals no swelling tenderness in upper or lower extremities. Good capillary refill. Lymphatic: The lymphatic examination bilaterally reveals all areas to be without enlargement or induration  Sensation: Sensation is intact without deficit  Coordination/Balance: Good coordination       LUMBAR/SACRAL EXAMINATION:  Inspection: Local inspection shows no step-off or bruising. Lumbar alignment is normal.  Sagittal and Coronal balance is neutral.      Palpation:   No evidence of tenderness at the midline. No tenderness bilaterally at the paraspinal or trochanters. There is no step-off or paraspinal spasm. Range of Motion: pain-free ROM   Hip ER and IR are pain free and within normal limits. Strength:   Strength testing is 4-/5 in all muscle groups tested. Special Tests:   Straight leg raise and crossed SLR negative. Slump test negative, Leg length and pelvis level. Skin: There are no rashes, ulcerations or lesions. Reflexes: Reflexes are symmetrically 2+ at the patellar and ankle tendons. Clonus absent bilaterally at the feet.   Gait & station: normal, patient ambulates without assistance  Additional Examinations:   RIGHT LOWER EXTREMITY: Inspection/examination of the right lower extremity does not show any tenderness, deformity or injury. Range of motion is unremarkable. There is no gross instability. There are no rashes, ulcerations or lesions. Strength and tone are normal.  LEFT LOWER EXTREMITY:  Inspection/examination of the left lower extremity does not show any tenderness, deformity or injury. Range of motion is unremarkable. There is no gross instability. There are no rashes, ulcerations or lesions. Strength and tone are normal.        Diagnostic Testing:    Reviewed   AP and lateral of the lumbar spine  Impression: No fractures or dislocations noted. Mild DDD throughout the lumbar spine. Mild endplate spurring. Impression:    Diagnosis Orders   1. Lumbar strain, initial encounter  [facility]    lidocaine (LIDODERM) 5 %    methylPREDNISolone (MEDROL, JULIA,) 4 MG tablet            Plan:    Above diagnoses are Improving    1. Medications: I will prescribe a medrol dose pack to help with the inflammatory component of pain. Risks, benefits and alternatives were discussed. Recommended to stop any NSAIDs to reduce risk of GI bleed during the course of the dose pack. 2. PT:  I will start the patient on a trial of PT to work on a lumbar stabilization program to focus on core strengthening, core stabilizing, lumbar stretches, hamstring flexibility, modalities as indicated for 6-8 visits over the next 4-6 weeks. 3. Further studies:  Can consider an MRI of the cervical spine if no improvement with a trial of physical therapy. 4. Interventional:  After a trial of conservative treatments is completed, interventional options may be discussed.           Luisito Lovell PA-C    Physician Assistant - Certified  [address] [place] [address]    02/26/23 5:01 PM

## 2023-03-01 ENCOUNTER — HOSPITAL ENCOUNTER (OUTPATIENT)
Dept: PHYSICAL THERAPY | Age: 23
Setting detail: THERAPIES SERIES
Discharge: HOME | End: 2023-03-01

## 2023-03-01 NOTE — FLOWSHEET NOTE
Physical Therapy  Cancellation/No-show Note  Patient Name:  Scott Rutherford  :  2000   Date:  3/1/2023  Cancelled visits to date: 0  No-shows to date: 1    For today's appointment patient:  []  Cancelled  []  Rescheduled appointment  [x]  No-show     Reason given by patient:  []  Patient ill  []  Conflicting appointment  []  No transportation    []  Conflict with work  []  No reason given  []  Other:     Comments:      Electronically signed by:  Paige Velasquez PT, PT

## 2024-02-15 ENCOUNTER — HOSPITAL ENCOUNTER (EMERGENCY)
Age: 24
Discharge: HOME | End: 2024-02-15
Attending: EMERGENCY MEDICINE

## 2024-02-15 VITALS
TEMPERATURE: 98.4 F | RESPIRATION RATE: 18 BRPM | HEIGHT: 65 IN | BODY MASS INDEX: 41.65 KG/M2 | HEART RATE: 95 BPM | SYSTOLIC BLOOD PRESSURE: 121 MMHG | WEIGHT: 250 LBS | OXYGEN SATURATION: 98 % | DIASTOLIC BLOOD PRESSURE: 59 MMHG

## 2024-02-15 DIAGNOSIS — U07.1 COVID: Primary | ICD-10-CM

## 2024-02-15 LAB
FLUAV RNA UPPER RESP QL NAA+PROBE: NEGATIVE
FLUBV AG NPH QL: NEGATIVE
S PYO AG THROAT QL: NEGATIVE
SARS-COV-2 RDRP RESP QL NAA+PROBE: DETECTED

## 2024-02-15 PROCEDURE — 87081 CULTURE SCREEN ONLY: CPT

## 2024-02-15 PROCEDURE — 99283 EMERGENCY DEPT VISIT LOW MDM: CPT

## 2024-02-15 PROCEDURE — 87635 SARS-COV-2 COVID-19 AMP PRB: CPT

## 2024-02-15 PROCEDURE — 87880 STREP A ASSAY W/OPTIC: CPT

## 2024-02-15 PROCEDURE — 87804 INFLUENZA ASSAY W/OPTIC: CPT

## 2024-02-15 PROCEDURE — 6370000000 HC RX 637 (ALT 250 FOR IP): Performed by: EMERGENCY MEDICINE

## 2024-02-15 RX ORDER — ONDANSETRON 4 MG/1
4 TABLET, ORALLY DISINTEGRATING ORAL ONCE
Status: COMPLETED | OUTPATIENT
Start: 2024-02-15 | End: 2024-02-15

## 2024-02-15 RX ORDER — IBUPROFEN 600 MG/1
600 TABLET ORAL 3 TIMES DAILY PRN
Qty: 30 TABLET | Refills: 0 | Status: SHIPPED | OUTPATIENT
Start: 2024-02-15

## 2024-02-15 RX ORDER — ONDANSETRON 4 MG/1
4 TABLET, ORALLY DISINTEGRATING ORAL 3 TIMES DAILY PRN
Qty: 21 TABLET | Refills: 0 | Status: SHIPPED | OUTPATIENT
Start: 2024-02-15

## 2024-02-15 RX ORDER — DEXTROMETHORPHAN HYDROBROMIDE, GUAIFENESIN 5; 100 MG/5ML; MG/5ML
20 LIQUID ORAL EVERY 4 HOURS PRN
Qty: 1 EACH | Refills: 0 | Status: SHIPPED | OUTPATIENT
Start: 2024-02-15 | End: 2024-02-22

## 2024-02-15 RX ADMIN — ONDANSETRON 4 MG: 4 TABLET, ORALLY DISINTEGRATING ORAL at 09:02

## 2024-02-15 NOTE — ED PROVIDER NOTES
CC:   Chief Complaint   Patient presents with    Pharyngitis     C/O fever, sore throat, nasal congestion, and vomiting since yesterday. Works in a group home.         HPI:  Pam is a 23 y.o. female who presents to the emergency department for evaluation of flulike symptoms.  She began yesterday with feeling ill, body aches sore throat fever nasal congestion dry nonproductive cough.  No nausea.  She threw up once today.  General malaise generalized weakness.  She works in a group home.    History obtained via the patient    External records reviewed    ROS:  All Pertinent ROS Negative Unless otherwise stated within HPI.    VITALS:  Vitals:    02/15/24 0827   BP: (!) 121/59   Pulse: 95   Resp: 18   Temp: 98.4 °F (36.9 °C)   SpO2: 98%        PHYSICAL EXAM:      Vital signs reviewed  General:  Patient appeared in no distress  Vitals:  Vital signs reviewed, see nurses notes  Neck:  No JVD, or lymphadenopathy.  HEENT exam mucous membranes are moist oropharynx is clear mild posterior oropharynx erythema no exudates no petechiae no adenopathy  Cardiovascular:  Regular rhythm, Normal sounds and absence of murmurs, rubs or gallops.  Lungs:  Clear to auscultation without rales, ronchi, or wheezing.  Abdomen:  Soft, unremarkable and without evidence of organomegally, masses, or abdominal aortic enlargement, No guarding.  Extremities:  Non-edematous and Normal distal pulses.  Neuro:  Nonfocal and Normal motor and sensation.     LABS/IMAGING:  Reviewed  No orders to display        Labs Reviewed   COVID-19, RAPID - Abnormal; Notable for the following components:       Result Value    SARS-CoV-2, NAAT DETECTED (*)     All other components within normal limits   RAPID INFLUENZA A/B ANTIGENS   STREP SCREEN GROUP A THROAT   CULTURE, BETA STREP CONFIRM PLATES        MEDICATIONS ADMINISTERED:  Medications   ondansetron (ZOFRAN-ODT) disintegrating tablet 4 mg (4 mg Oral Given 2/15/24 0902)       Labs reviewed        MEDICAL

## 2024-02-18 LAB — S PYO THROAT QL CULT: NORMAL

## 2024-03-31 ENCOUNTER — HOSPITAL ENCOUNTER (EMERGENCY)
Age: 24
Discharge: HOME | End: 2024-03-31
Attending: EMERGENCY MEDICINE

## 2024-03-31 VITALS
SYSTOLIC BLOOD PRESSURE: 144 MMHG | HEART RATE: 99 BPM | DIASTOLIC BLOOD PRESSURE: 69 MMHG | HEIGHT: 65 IN | OXYGEN SATURATION: 97 % | BODY MASS INDEX: 48.82 KG/M2 | RESPIRATION RATE: 16 BRPM | TEMPERATURE: 98.2 F | WEIGHT: 293 LBS

## 2024-03-31 DIAGNOSIS — R09.89 RHONCHI: ICD-10-CM

## 2024-03-31 DIAGNOSIS — J18.9 PNEUMONIA OF LEFT UPPER LOBE DUE TO INFECTIOUS ORGANISM: Primary | ICD-10-CM

## 2024-03-31 DIAGNOSIS — R50.9 FEBRILE ILLNESS: ICD-10-CM

## 2024-03-31 LAB
FLUAV RNA UPPER RESP QL NAA+PROBE: NEGATIVE
FLUBV AG NPH QL: NEGATIVE

## 2024-03-31 PROCEDURE — 87804 INFLUENZA ASSAY W/OPTIC: CPT

## 2024-03-31 PROCEDURE — 99284 EMERGENCY DEPT VISIT MOD MDM: CPT

## 2024-03-31 PROCEDURE — 6370000000 HC RX 637 (ALT 250 FOR IP): Performed by: EMERGENCY MEDICINE

## 2024-03-31 RX ORDER — PREDNISONE 10 MG/1
TABLET ORAL
Qty: 20 TABLET | Refills: 0 | Status: SHIPPED | OUTPATIENT
Start: 2024-03-31 | End: 2024-04-07

## 2024-03-31 RX ORDER — ACETAMINOPHEN 160 MG/5ML
650 LIQUID ORAL ONCE
Status: COMPLETED | OUTPATIENT
Start: 2024-03-31 | End: 2024-03-31

## 2024-03-31 RX ORDER — ALBUTEROL SULFATE 90 UG/1
2 AEROSOL, METERED RESPIRATORY (INHALATION) 4 TIMES DAILY PRN
Qty: 18 G | Refills: 0 | Status: SHIPPED | OUTPATIENT
Start: 2024-03-31 | End: 2024-04-17

## 2024-03-31 RX ORDER — DOXYCYCLINE HYCLATE 100 MG
100 TABLET ORAL 2 TIMES DAILY
Qty: 14 TABLET | Refills: 0 | Status: SHIPPED | OUTPATIENT
Start: 2024-03-31 | End: 2024-04-07

## 2024-03-31 RX ORDER — IPRATROPIUM BROMIDE AND ALBUTEROL SULFATE 2.5; .5 MG/3ML; MG/3ML
1 SOLUTION RESPIRATORY (INHALATION) ONCE
Status: COMPLETED | OUTPATIENT
Start: 2024-03-31 | End: 2024-03-31

## 2024-03-31 RX ORDER — DOXYCYCLINE HYCLATE 100 MG
100 TABLET ORAL ONCE
Status: COMPLETED | OUTPATIENT
Start: 2024-03-31 | End: 2024-03-31

## 2024-03-31 RX ADMIN — IPRATROPIUM BROMIDE AND ALBUTEROL SULFATE 1 DOSE: 2.5; .5 SOLUTION RESPIRATORY (INHALATION) at 20:04

## 2024-03-31 RX ADMIN — ACETAMINOPHEN 650 MG: 160 SOLUTION ORAL at 20:03

## 2024-03-31 RX ADMIN — DOXYCYCLINE HYCLATE 100 MG: 100 TABLET, COATED ORAL at 20:45

## 2024-03-31 RX ADMIN — PREDNISONE 50 MG: 20 TABLET ORAL at 20:02

## 2024-03-31 ASSESSMENT — LIFESTYLE VARIABLES
HOW MANY STANDARD DRINKS CONTAINING ALCOHOL DO YOU HAVE ON A TYPICAL DAY: PATIENT DOES NOT DRINK
HOW OFTEN DO YOU HAVE A DRINK CONTAINING ALCOHOL: NEVER

## 2024-03-31 ASSESSMENT — PAIN SCALES - GENERAL
PAINLEVEL_OUTOF10: 0
PAINLEVEL_OUTOF10: 0

## 2024-03-31 ASSESSMENT — PAIN - FUNCTIONAL ASSESSMENT: PAIN_FUNCTIONAL_ASSESSMENT: 0-10

## 2024-03-31 NOTE — ED PROVIDER NOTES
Emergency Department Attending Physician Note  Location: [facility] EMERGENCY DEPARTMENT  3/31/2024       Pt Name: Pam Carlos  MRN: [id number]  Birthdate 2000    Date of evaluation: 3/31/2024  Provider: ETHAN POLK DO  PCP: Nafisa Garrett MD    Note Started: 7:45 PM EDT 3/31/24    CHIEF COMPLAINT  Chief Complaint   Patient presents with    Illness     Cough and feeling short of breath for 3 days.        ROOM: 6    HISTORY OF PRESENT ILLNESS:  History obtained by patient. Limitations to history : None.     Pam Carlos is a 23 y.o. female with a significant PMHx of obesity, GERD, multiple previous EGDs, chronic low back pain, and other comorbidities as above, presenting emergency department today with concerns of cough and congestion, and a fever today.  She does arrive to [facility] department today with fever of 100.4.  Did not take anything prior to arrival.  Says she recently had COVID-19 back in February, got better after that, but her  is now sick again, and is on antibiotics for bacterial pneumonia suspected.  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  Otherwise, she has a slight headache, again, is febrile here, but nothing significant she says.  Does not want a thing for pain.  Denies any nausea, vomiting, diarrhea.  No chance pregnancy.  No abdominal pain.  Did have a little bit of back pain with coughing earlier today, but that is also very minor.  Just wants to make sure she does not have any pneumonia, as she has a fever.  Denies any dysuria or hematuria.  Of note, patient has an esophageal ulcer, and says sometimes it larger pills feel like they get stuck in her throat, and would like liquid medicine if possible.     On chart review, patient was seen in the emergency department back in February, for concerns of sore throat, congestion, nausea, vomiting, and fevers.  Was positive for COVID-19.  Given Zofran, discharged home in stable

## 2024-04-12 ENCOUNTER — HOSPITAL ENCOUNTER (EMERGENCY)
Age: 24
Discharge: HOME | End: 2024-04-12
Attending: STUDENT IN AN ORGANIZED HEALTH CARE EDUCATION/TRAINING PROGRAM

## 2024-04-12 ENCOUNTER — APPOINTMENT (OUTPATIENT)
Dept: GENERAL RADIOLOGY | Age: 24
End: 2024-04-12

## 2024-04-12 VITALS
RESPIRATION RATE: 18 BRPM | BODY MASS INDEX: 48.82 KG/M2 | TEMPERATURE: 99.7 F | DIASTOLIC BLOOD PRESSURE: 88 MMHG | WEIGHT: 293 LBS | OXYGEN SATURATION: 98 % | HEIGHT: 65 IN | SYSTOLIC BLOOD PRESSURE: 148 MMHG | HEART RATE: 99 BPM

## 2024-04-12 DIAGNOSIS — M54.41 ACUTE RIGHT-SIDED BACK PAIN WITH SCIATICA: Primary | ICD-10-CM

## 2024-04-12 PROCEDURE — 72100 X-RAY EXAM L-S SPINE 2/3 VWS: CPT

## 2024-04-12 PROCEDURE — 6370000000 HC RX 637 (ALT 250 FOR IP): Performed by: STUDENT IN AN ORGANIZED HEALTH CARE EDUCATION/TRAINING PROGRAM

## 2024-04-12 PROCEDURE — 6360000002 HC RX W HCPCS: Performed by: STUDENT IN AN ORGANIZED HEALTH CARE EDUCATION/TRAINING PROGRAM

## 2024-04-12 PROCEDURE — 99284 EMERGENCY DEPT VISIT MOD MDM: CPT

## 2024-04-12 PROCEDURE — 96372 THER/PROPH/DIAG INJ SC/IM: CPT

## 2024-04-12 RX ORDER — ORPHENADRINE CITRATE 30 MG/ML
60 INJECTION INTRAMUSCULAR; INTRAVENOUS ONCE
Status: COMPLETED | OUTPATIENT
Start: 2024-04-12 | End: 2024-04-12

## 2024-04-12 RX ORDER — ACETAMINOPHEN 500 MG
1000 TABLET ORAL
Status: COMPLETED | OUTPATIENT
Start: 2024-04-12 | End: 2024-04-12

## 2024-04-12 RX ORDER — KETOROLAC TROMETHAMINE 30 MG/ML
30 INJECTION, SOLUTION INTRAMUSCULAR; INTRAVENOUS ONCE
Status: COMPLETED | OUTPATIENT
Start: 2024-04-12 | End: 2024-04-12

## 2024-04-12 RX ORDER — METHOCARBAMOL 500 MG/1
500 TABLET, FILM COATED ORAL 4 TIMES DAILY PRN
Qty: 20 TABLET | Refills: 0 | Status: SHIPPED | OUTPATIENT
Start: 2024-04-12 | End: 2024-04-17

## 2024-04-12 RX ADMIN — ORPHENADRINE CITRATE 60 MG: 60 INJECTION INTRAMUSCULAR; INTRAVENOUS at 19:40

## 2024-04-12 RX ADMIN — ACETAMINOPHEN 1000 MG: 500 TABLET ORAL at 19:39

## 2024-04-12 RX ADMIN — KETOROLAC TROMETHAMINE 30 MG: 30 INJECTION, SOLUTION INTRAMUSCULAR at 19:39

## 2024-04-12 ASSESSMENT — PAIN DESCRIPTION - ORIENTATION: ORIENTATION: LOWER

## 2024-04-12 ASSESSMENT — PAIN - FUNCTIONAL ASSESSMENT: PAIN_FUNCTIONAL_ASSESSMENT: 0-10

## 2024-04-12 ASSESSMENT — PAIN SCALES - GENERAL
PAINLEVEL_OUTOF10: 6
PAINLEVEL_OUTOF10: 9

## 2024-04-12 ASSESSMENT — PAIN DESCRIPTION - LOCATION: LOCATION: BACK

## 2024-04-12 ASSESSMENT — PAIN DESCRIPTION - DESCRIPTORS: DESCRIPTORS: SHARP;SHOOTING

## 2024-04-12 ASSESSMENT — LIFESTYLE VARIABLES
HOW OFTEN DO YOU HAVE A DRINK CONTAINING ALCOHOL: NEVER
HOW MANY STANDARD DRINKS CONTAINING ALCOHOL DO YOU HAVE ON A TYPICAL DAY: PATIENT DOES NOT DRINK

## 2024-04-12 ASSESSMENT — PAIN DESCRIPTION - FREQUENCY: FREQUENCY: INTERMITTENT

## 2024-04-13 NOTE — ED NOTES
[facility]  Notified her that I am still waiting on her xray results.

## 2024-04-13 NOTE — DISCHARGE INSTRUCTIONS
Return to nearest ED if you have severe worsening pain, new numbness and tingling, or if you pee or poop on yourself.  Imaging without milligrams of acetaminophen every 8 hours and 6 and milligrams of ibuprofen every 6 hours for pain.  Do not drive or drink alcohol while taking methocarbamol.

## 2024-04-13 NOTE — ED PROVIDER NOTES
Spouse name: Not on file    Number of children: Not on file    Years of education: Not on file    Highest education level: Not on file   Occupational History    Not on file   Tobacco Use    Smoking status: Never    Smokeless tobacco: Never   Vaping Use    Vaping Use: Never used   Substance and Sexual Activity    Alcohol use: No    Drug use: No    Sexual activity: Not Currently   Other Topics Concern    Not on file   Social History Narrative    Not on file     Social Determinants of Health     Financial Resource Strain: Not on file   Food Insecurity: Not on file   Transportation Needs: Not on file   Physical Activity: Not on file   Stress: Not on file   Social Connections: Not on file   Intimate Partner Violence: Not on file   Housing Stability: Not on file     No current facility-administered medications for this encounter.     Current Outpatient Medications   Medication Sig Dispense Refill    methocarbamol (ROBAXIN) 500 MG tablet Take 1 tablet by mouth 4 times daily as needed (Muscle spasms) 20 tablet 0    albuterol sulfate HFA (VENTOLIN HFA) 108 (90 Base) MCG/ACT inhaler Inhale 2 puffs into the lungs 4 times daily as needed for Wheezing 18 g 0     No Known Allergies    REVIEW OF SYSTEMS  10 systems reviewed, pertinent positives per HPI otherwise noted to be negative.    PHYSICAL EXAM  BP (!) 148/88   Pulse 99   Temp 99.7 °F (37.6 °C) (Oral)   Resp 18   Ht 1.651 m (5' 5\")   Wt (!) 185.2 kg (408 lb 4.8 oz)   LMP 11/29/2023   SpO2 98%   BMI 67.94 kg/m²    General: Appears well.  Alert  HEENT: Head atraumatic, Eyes normal inspection, PERRL.  Normal ENT inspection, Pharynx normal. No signs of dehydration  NECK: Normal inspection  RESPIRATORY: Normal breath sounds. No chest wall tenderness. No respiratory distress  CVS: Heart rate and rhythm regular. No Murmurs  ABDOMEN/GI: Soft, Non-tender, No distention  BACK: No midline tenderness  EXTREMITIES: Non-Tender. Full ROM. Normal appearance. No Pedal edema  NEURO:  (Muscle spasms)       Follow-up with:  Nafisa Garrett MD  [address] [facility] [address]  [phone]    Schedule an appointment as soon as possible for a visit in 3 days  Follow up within 3 days, Return to ED sooner if symptoms worsen    [facility]  [address]  [address]  [facility] [address]  [phone]  Call in 1 week        DISCLAIMER: This chart was created using Dragon dictation software.  Efforts were made by me to ensure accuracy, however some errors may be present due to limitations of this technology and occasionally words are not transcribed correctly.     Glenn Wilson,   04/12/24 0334

## 2025-07-16 ASSESSMENT — PATIENT HEALTH QUESTIONNAIRE - PHQ9
SUM OF ALL RESPONSES TO PHQ QUESTIONS 1-9: 1
SUM OF ALL RESPONSES TO PHQ QUESTIONS 1-9: 1
2. FEELING DOWN, DEPRESSED OR HOPELESS: SEVERAL DAYS
1. LITTLE INTEREST OR PLEASURE IN DOING THINGS: NOT AT ALL
SUM OF ALL RESPONSES TO PHQ QUESTIONS 1-9: 1
SUM OF ALL RESPONSES TO PHQ QUESTIONS 1-9: 1
1. LITTLE INTEREST OR PLEASURE IN DOING THINGS: NOT AT ALL
SUM OF ALL RESPONSES TO PHQ9 QUESTIONS 1 & 2: 1
2. FEELING DOWN, DEPRESSED OR HOPELESS: SEVERAL DAYS

## 2025-07-17 ENCOUNTER — OFFICE VISIT (OUTPATIENT)
Dept: PRIMARY CARE CLINIC | Age: 25
End: 2025-07-17
Payer: MEDICAID

## 2025-07-17 VITALS
TEMPERATURE: 98.3 F | DIASTOLIC BLOOD PRESSURE: 80 MMHG | HEIGHT: 65 IN | SYSTOLIC BLOOD PRESSURE: 122 MMHG | WEIGHT: 293 LBS | BODY MASS INDEX: 48.82 KG/M2 | HEART RATE: 88 BPM | OXYGEN SATURATION: 97 %

## 2025-07-17 DIAGNOSIS — H52.13 MYOPIC ASTIGMATISM, BILATERAL: ICD-10-CM

## 2025-07-17 DIAGNOSIS — L83 ACQUIRED ACANTHOSIS NIGRICANS: ICD-10-CM

## 2025-07-17 DIAGNOSIS — Z13.220 SCREENING FOR LIPID DISORDERS: ICD-10-CM

## 2025-07-17 DIAGNOSIS — Z00.00 ENCOUNTER FOR WELLNESS EXAMINATION IN ADULT: Primary | ICD-10-CM

## 2025-07-17 DIAGNOSIS — Z11.59 NEED FOR HEPATITIS C SCREENING TEST: ICD-10-CM

## 2025-07-17 DIAGNOSIS — Z13.1 SCREENING FOR DIABETES MELLITUS: ICD-10-CM

## 2025-07-17 DIAGNOSIS — Z11.4 SCREENING FOR HIV WITHOUT PRESENCE OF RISK FACTORS: ICD-10-CM

## 2025-07-17 DIAGNOSIS — M54.50 CHRONIC MIDLINE LOW BACK PAIN WITHOUT SCIATICA: ICD-10-CM

## 2025-07-17 DIAGNOSIS — M51.360 DEGENERATION OF INTERVERTEBRAL DISC OF LUMBAR REGION WITH DISCOGENIC BACK PAIN: ICD-10-CM

## 2025-07-17 DIAGNOSIS — Z11.3 ROUTINE SCREENING FOR STI (SEXUALLY TRANSMITTED INFECTION): ICD-10-CM

## 2025-07-17 DIAGNOSIS — Z13.0 SCREENING FOR DEFICIENCY ANEMIA: ICD-10-CM

## 2025-07-17 DIAGNOSIS — Z13.29 SCREENING FOR THYROID DISORDER: ICD-10-CM

## 2025-07-17 DIAGNOSIS — Z12.4 SCREENING FOR CERVICAL CANCER: ICD-10-CM

## 2025-07-17 DIAGNOSIS — Z13.21 ENCOUNTER FOR VITAMIN DEFICIENCY SCREENING: ICD-10-CM

## 2025-07-17 DIAGNOSIS — E66.01 MORBID OBESITY WITH BMI OF 60.0-69.9, ADULT (HCC): ICD-10-CM

## 2025-07-17 DIAGNOSIS — G89.29 CHRONIC MIDLINE LOW BACK PAIN WITHOUT SCIATICA: ICD-10-CM

## 2025-07-17 DIAGNOSIS — N92.6 IRREGULAR MENSES: ICD-10-CM

## 2025-07-17 DIAGNOSIS — K21.9 CHRONIC GERD: ICD-10-CM

## 2025-07-17 DIAGNOSIS — H52.203 MYOPIC ASTIGMATISM, BILATERAL: ICD-10-CM

## 2025-07-17 DIAGNOSIS — K22.10 ESOPHAGEAL ULCER WITHOUT BLEEDING: ICD-10-CM

## 2025-07-17 PROBLEM — Z79.1 NSAID LONG-TERM USE: Status: RESOLVED | Noted: 2019-11-26 | Resolved: 2025-07-17

## 2025-07-17 PROBLEM — M54.16 LUMBAR RADICULOPATHY: Status: ACTIVE | Noted: 2022-11-02

## 2025-07-17 PROBLEM — R03.0 ELEVATED BLOOD PRESSURE READING: Status: RESOLVED | Noted: 2018-08-15 | Resolved: 2025-07-17

## 2025-07-17 PROBLEM — F32.A DEPRESSIVE DISORDER: Status: RESOLVED | Noted: 2021-04-09 | Resolved: 2025-07-17

## 2025-07-17 PROBLEM — F41.9 ANXIETY: Status: ACTIVE | Noted: 2021-04-09

## 2025-07-17 PROBLEM — S39.012D LUMBAR SPINE STRAIN, SUBSEQUENT ENCOUNTER: Status: RESOLVED | Noted: 2022-11-02 | Resolved: 2025-07-17

## 2025-07-17 PROCEDURE — 99385 PREV VISIT NEW AGE 18-39: CPT

## 2025-07-17 RX ORDER — NORGESTIMATE AND ETHINYL ESTRADIOL 0.25-0.035
1 KIT ORAL DAILY
COMMUNITY
Start: 2025-03-14 | End: 2025-07-17

## 2025-07-17 RX ORDER — SEMAGLUTIDE 0.5 MG/.5ML
INJECTION, SOLUTION SUBCUTANEOUS
COMMUNITY
Start: 2025-04-14

## 2025-07-17 SDOH — ECONOMIC STABILITY: FOOD INSECURITY: WITHIN THE PAST 12 MONTHS, THE FOOD YOU BOUGHT JUST DIDN'T LAST AND YOU DIDN'T HAVE MONEY TO GET MORE.: NEVER TRUE

## 2025-07-17 SDOH — ECONOMIC STABILITY: FOOD INSECURITY: WITHIN THE PAST 12 MONTHS, YOU WORRIED THAT YOUR FOOD WOULD RUN OUT BEFORE YOU GOT MONEY TO BUY MORE.: NEVER TRUE

## 2025-07-17 ASSESSMENT — ENCOUNTER SYMPTOMS
COLOR CHANGE: 0
WHEEZING: 0
CONSTIPATION: 0
ANAL BLEEDING: 0
BLOOD IN STOOL: 0
BACK PAIN: 1
ABDOMINAL PAIN: 0
COUGH: 0
DIARRHEA: 0
TROUBLE SWALLOWING: 0
SHORTNESS OF BREATH: 0

## 2025-07-17 NOTE — ASSESSMENT & PLAN NOTE
Plan to establish with gynecology  -Recent history of oral BC, stopped due to weigh gain.   -Patient due for annual GYN examination, pap smear, discuss BC options - seeking weight neutral   -F/u as needed   Orders:    Leonardo Silverman MD, Gynecology, [facility]

## 2025-07-17 NOTE — ASSESSMENT & PLAN NOTE
Referral placed to bariatric management team  -F/u as needed with PCP   Orders:    Skinny Delcid DO, Bariatric Surgery, [facility]

## 2025-07-17 NOTE — ASSESSMENT & PLAN NOTE
Monitored by specialist, patient following with ophthalmology yearly.  Last visit March 2025.

## 2025-07-17 NOTE — PROGRESS NOTES
2025    Pam Carlos (:  2000) is a 24 y.o. female, here for a preventive medicine evaluation.    Subjective   Patient Active Problem List   Diagnosis    Chronic GERD    Chronic midline low back pain without sciatica    Esophageal ulcer without bleeding    Abnormal tympanic membrane of left ear    Morbid obesity with BMI of 60.0-69.9, adult (HCC)    Acquired acanthosis nigricans    Anxiety    Irregular menses    Low HDL (under 40)    Myopic astigmatism, bilateral    Lumbar radiculopathy     Patient presents to [facility] to establish care and annual preventative evaluation.  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    Patient reports recent use of Wegovy for weight loss from 2025 through 2025.  Patient reports she lost 50 pounds while on injections, weight loss has been sustained.  Patient is not currently interested in surgery intervention for elevated BMI due to her mother's poor surgical outcomes relating to bariatric surgery.      Patient also reports history of mild DDD in the lumbar region.  Patient reports she has seen orthopedic and physical therapy in the past with injection therapy to help manage the lower back pain. Patient is interested in injection therapy today.    No additional concerns per patient.     Health Maintenance  Vision- following, last seen 2025, no concerns  Dental- following, last seen 2025, no concerns  GYN- needs new referral     Relevant Chart Review:    Ortho/ Physical therapy- LBP , urgent cares recently   GI- EGD, GERD   Bariatric- elevated BMI, surgery consult       Review of Systems   Constitutional:  Negative for unexpected weight change.   HENT:  Negative for nosebleeds, tinnitus and trouble swallowing.    Eyes:  Negative for visual disturbance.   Respiratory:  Negative for cough, shortness of

## 2025-07-17 NOTE — ASSESSMENT & PLAN NOTE
Recommend weight loss and over-the-counter Tylenol or Motrin to manage acute pain  - Referral placed to orthopedic for possible injection therapy per patient request

## 2025-07-18 ENCOUNTER — TELEPHONE (OUTPATIENT)
Dept: PRIMARY CARE CLINIC | Age: 25
End: 2025-07-18

## 2025-07-18 DIAGNOSIS — K21.9 CHRONIC GERD: Primary | ICD-10-CM

## 2025-07-18 RX ORDER — OMEPRAZOLE 40 MG/1
40 CAPSULE, DELAYED RELEASE ORAL
Qty: 90 CAPSULE | Refills: 1 | Status: SHIPPED | OUTPATIENT
Start: 2025-07-18

## 2025-07-22 ENCOUNTER — CLINICAL SUPPORT (OUTPATIENT)
Dept: PRIMARY CARE CLINIC | Age: 25
End: 2025-07-22
Payer: MEDICAID

## 2025-07-22 DIAGNOSIS — Z11.4 SCREENING FOR HIV WITHOUT PRESENCE OF RISK FACTORS: ICD-10-CM

## 2025-07-22 DIAGNOSIS — Z13.21 ENCOUNTER FOR VITAMIN DEFICIENCY SCREENING: ICD-10-CM

## 2025-07-22 DIAGNOSIS — Z13.29 SCREENING FOR THYROID DISORDER: ICD-10-CM

## 2025-07-22 DIAGNOSIS — Z11.59 NEED FOR HEPATITIS C SCREENING TEST: ICD-10-CM

## 2025-07-22 DIAGNOSIS — R73.03 PREDIABETES: ICD-10-CM

## 2025-07-22 DIAGNOSIS — Z11.3 ROUTINE SCREENING FOR STI (SEXUALLY TRANSMITTED INFECTION): ICD-10-CM

## 2025-07-22 DIAGNOSIS — Z13.220 SCREENING FOR LIPID DISORDERS: ICD-10-CM

## 2025-07-22 DIAGNOSIS — Z13.0 SCREENING FOR DEFICIENCY ANEMIA: ICD-10-CM

## 2025-07-22 DIAGNOSIS — Z23 NEED FOR TDAP VACCINATION: Primary | ICD-10-CM

## 2025-07-22 PROBLEM — M54.50 LOW BACK PAIN: Status: ACTIVE | Noted: 2025-02-20

## 2025-07-22 PROBLEM — K21.9 GASTROESOPHAGEAL REFLUX DISEASE: Status: ACTIVE | Noted: 2025-02-18

## 2025-07-22 PROBLEM — N91.2 AMENORRHEA: Status: ACTIVE | Noted: 2025-02-20

## 2025-07-22 PROCEDURE — 90471 IMMUNIZATION ADMIN: CPT

## 2025-07-22 PROCEDURE — 90715 TDAP VACCINE 7 YRS/> IM: CPT

## 2025-07-22 NOTE — PROGRESS NOTES
Patient presents to office for Tdap vaccination.     Lab work reordered r/t 7/17/2025 adult wellness examination for lab drawn. MA unable to collect blood in office today.     Electronically signed by RUSLAN Roy CNP on 7/22/2025 at 8:44 AM

## 2025-08-06 ENCOUNTER — OFFICE VISIT (OUTPATIENT)
Dept: BARIATRICS/WEIGHT MGMT | Age: 25
End: 2025-08-06

## 2025-08-06 VITALS
HEART RATE: 62 BPM | WEIGHT: 293 LBS | HEIGHT: 66 IN | DIASTOLIC BLOOD PRESSURE: 69 MMHG | SYSTOLIC BLOOD PRESSURE: 117 MMHG | BODY MASS INDEX: 47.09 KG/M2

## 2025-08-06 DIAGNOSIS — E88.810 METABOLIC SYNDROME: Primary | ICD-10-CM

## 2025-08-06 DIAGNOSIS — E66.01 MORBID OBESITY WITH BMI OF 50.0-59.9, ADULT (HCC): ICD-10-CM

## 2025-08-06 DIAGNOSIS — R73.03 PREDIABETES: ICD-10-CM

## 2025-08-06 DIAGNOSIS — K21.9 CHRONIC GERD: ICD-10-CM

## 2025-08-07 ENCOUNTER — TELEPHONE (OUTPATIENT)
Dept: PRIMARY CARE CLINIC | Age: 25
End: 2025-08-07

## 2025-08-28 ENCOUNTER — OFFICE VISIT (OUTPATIENT)
Dept: PRIMARY CARE CLINIC | Age: 25
End: 2025-08-28
Payer: COMMERCIAL

## 2025-08-28 VITALS
OXYGEN SATURATION: 99 % | HEART RATE: 80 BPM | TEMPERATURE: 98.6 F | BODY MASS INDEX: 47.09 KG/M2 | WEIGHT: 293 LBS | HEIGHT: 66 IN | SYSTOLIC BLOOD PRESSURE: 110 MMHG | DIASTOLIC BLOOD PRESSURE: 70 MMHG

## 2025-08-28 DIAGNOSIS — F41.9 ANXIETY: Primary | ICD-10-CM

## 2025-08-28 PROBLEM — M54.16 LUMBAR RADICULOPATHY: Status: RESOLVED | Noted: 2022-11-02 | Resolved: 2025-08-28

## 2025-08-28 PROBLEM — K22.10 ESOPHAGEAL ULCER WITHOUT BLEEDING: Status: RESOLVED | Noted: 2020-01-31 | Resolved: 2025-08-28

## 2025-08-28 PROBLEM — H73.92 ABNORMAL TYMPANIC MEMBRANE OF LEFT EAR: Status: RESOLVED | Noted: 2020-02-29 | Resolved: 2025-08-28

## 2025-08-28 PROCEDURE — 99213 OFFICE O/P EST LOW 20 MIN: CPT

## 2025-08-28 PROCEDURE — 1036F TOBACCO NON-USER: CPT

## 2025-08-28 PROCEDURE — G8417 CALC BMI ABV UP PARAM F/U: HCPCS

## 2025-08-28 PROCEDURE — G8427 DOCREV CUR MEDS BY ELIG CLIN: HCPCS

## 2025-08-28 RX ORDER — ESCITALOPRAM OXALATE 10 MG/1
10 TABLET ORAL DAILY
Qty: 45 TABLET | Refills: 0 | Status: SHIPPED | OUTPATIENT
Start: 2025-08-28 | End: 2025-10-12

## 2025-08-28 ASSESSMENT — PATIENT HEALTH QUESTIONNAIRE - PHQ9
2. FEELING DOWN, DEPRESSED OR HOPELESS: SEVERAL DAYS
SUM OF ALL RESPONSES TO PHQ QUESTIONS 1-9: 1
1. LITTLE INTEREST OR PLEASURE IN DOING THINGS: NOT AT ALL

## (undated) DEVICE — BW-412T DISP COMBO CLEANING BRUSH: Brand: SINGLE USE COMBINATION CLEANING BRUSH

## (undated) DEVICE — MOUTHPIECE ENDOSCP L CTRL OPN AND SIDE PORTS DISP

## (undated) DEVICE — PROCEDURE KIT ENDOSCP CUST

## (undated) DEVICE — SOLUTION IV IRRIG WATER 500ML POUR BRL ST 2F7113

## (undated) DEVICE — SET VLV 3 PC AWS DISPOSABLE GRDIAN SCOPEVALET

## (undated) DEVICE — FORCEPS BX L240CM WRK CHN 2.8MM STD CAP W/ NDL MIC MESH

## (undated) DEVICE — VALVE SUCTION AIR H2O SET ORCA POD + DISP

## (undated) DEVICE — AIR/WATER CLEANING ADAPTER FOR OLYMPUS® GI ENDOSCOPE: Brand: BULLDOG®

## (undated) DEVICE — ENDOSCOPIC KIT 6X3/16 FT COLON W/ 1.1 OZ 2 GWN W/O BRSH